# Patient Record
Sex: MALE | Race: WHITE | NOT HISPANIC OR LATINO | Employment: OTHER | URBAN - METROPOLITAN AREA
[De-identification: names, ages, dates, MRNs, and addresses within clinical notes are randomized per-mention and may not be internally consistent; named-entity substitution may affect disease eponyms.]

---

## 2018-10-02 PROBLEM — Z12.11 COLON CANCER SCREENING: Status: ACTIVE | Noted: 2018-10-02

## 2019-04-22 ENCOUNTER — TELEPHONE (OUTPATIENT)
Dept: FAMILY MEDICINE | Facility: CLINIC | Age: 69
End: 2019-04-22

## 2019-04-22 NOTE — TELEPHONE ENCOUNTER
----- Message from Melita Barrera sent at 4/22/2019 11:58 AM CDT -----    Type: Needs Medical Advice    Who Called:  pt  Best Call Back Number:[phone]  Additional Information:  Pt  Is calling  With  Questions  About  A  Surgery  Release   Paperwork   Also  Has  Questions  About  The  Script   For     Steroids

## 2019-04-29 ENCOUNTER — PATIENT OUTREACH (OUTPATIENT)
Dept: ADMINISTRATIVE | Facility: HOSPITAL | Age: 69
End: 2019-04-29

## 2019-04-29 NOTE — PROGRESS NOTES
Health Maintenance Due   Topic Date Due    Hepatitis C Screening  1950    TETANUS VACCINE  09/25/1969    Pneumococcal Vaccine (65+ Low/Medium Risk) (2 of 2 - PCV13) 09/25/2018     Pre-visit Chart review complete/scrubbed for this upcoming office visit

## 2019-05-09 ENCOUNTER — OFFICE VISIT (OUTPATIENT)
Dept: FAMILY MEDICINE | Facility: CLINIC | Age: 69
End: 2019-05-09
Payer: MEDICARE

## 2019-05-09 VITALS
WEIGHT: 315 LBS | TEMPERATURE: 98 F | SYSTOLIC BLOOD PRESSURE: 139 MMHG | DIASTOLIC BLOOD PRESSURE: 86 MMHG | HEIGHT: 71 IN | OXYGEN SATURATION: 97 % | BODY MASS INDEX: 44.1 KG/M2 | HEART RATE: 69 BPM

## 2019-05-09 DIAGNOSIS — G47.33 OBSTRUCTIVE SLEEP APNEA SYNDROME: ICD-10-CM

## 2019-05-09 DIAGNOSIS — R73.03 PRE-DIABETES: ICD-10-CM

## 2019-05-09 DIAGNOSIS — G56.03 CARPAL TUNNEL SYNDROME ON BOTH SIDES: ICD-10-CM

## 2019-05-09 DIAGNOSIS — E66.01 CLASS 3 SEVERE OBESITY DUE TO EXCESS CALORIES WITHOUT SERIOUS COMORBIDITY WITH BODY MASS INDEX (BMI) OF 50.0 TO 59.9 IN ADULT: ICD-10-CM

## 2019-05-09 DIAGNOSIS — N40.1 BPH ASSOCIATED WITH NOCTURIA: ICD-10-CM

## 2019-05-09 DIAGNOSIS — R35.1 BPH ASSOCIATED WITH NOCTURIA: ICD-10-CM

## 2019-05-09 DIAGNOSIS — F32.4 MAJOR DEPRESSIVE DISORDER WITH SINGLE EPISODE, IN PARTIAL REMISSION: ICD-10-CM

## 2019-05-09 DIAGNOSIS — I10 ESSENTIAL HYPERTENSION: ICD-10-CM

## 2019-05-09 PROBLEM — E66.813 CLASS 3 SEVERE OBESITY DUE TO EXCESS CALORIES WITHOUT SERIOUS COMORBIDITY IN ADULT: Status: ACTIVE | Noted: 2019-05-09

## 2019-05-09 PROCEDURE — 99999 PR PBB SHADOW E&M-EST. PATIENT-LVL III: ICD-10-PCS | Mod: PBBFAC,,, | Performed by: INTERNAL MEDICINE

## 2019-05-09 PROCEDURE — 99214 PR OFFICE/OUTPT VISIT, EST, LEVL IV, 30-39 MIN: ICD-10-PCS | Mod: S$PBB,25,, | Performed by: INTERNAL MEDICINE

## 2019-05-09 PROCEDURE — 99213 OFFICE O/P EST LOW 20 MIN: CPT | Mod: PBBFAC,PO | Performed by: INTERNAL MEDICINE

## 2019-05-09 PROCEDURE — 99999 PR PBB SHADOW E&M-EST. PATIENT-LVL III: CPT | Mod: PBBFAC,,, | Performed by: INTERNAL MEDICINE

## 2019-05-09 PROCEDURE — 99214 OFFICE O/P EST MOD 30 MIN: CPT | Mod: S$PBB,25,, | Performed by: INTERNAL MEDICINE

## 2019-05-09 RX ORDER — DICLOFENAC SODIUM 10 MG/G
GEL TOPICAL
Refills: 1 | COMMUNITY
Start: 2019-05-07 | End: 2019-09-20 | Stop reason: CLARIF

## 2019-05-09 NOTE — PROGRESS NOTES
Patient ID: Corey De La O     Chief Complaint:   Chief Complaint   Patient presents with    Surgery Clearance        HPI: New pt to [facility], but known to me. Pre-op clearance for L CTR by Dr. Pack coming up. Doing well. No CP or SOB. May not need CTR on R. 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 Carpal Tunnel was found at a later date.     Review of Systems   Constitutional: Negative.    HENT: Negative.    Eyes: Negative.    Respiratory: Negative.    Cardiovascular: Negative.    Gastrointestinal: Negative.    Endocrine: Negative.    Genitourinary: Negative.    Musculoskeletal: Negative.    Skin: Negative.    Allergic/Immunologic: Negative.    Neurological: Positive for numbness.   Hematological: Negative.    Psychiatric/Behavioral: Negative.           Objective:      Physical Exam   Physical Exam   Constitutional: He is oriented to person, place, and time. He appears well-developed and well-nourished.   HENT:   Head: Normocephalic and atraumatic.   Nose: Nose normal.   Mouth/Throat: Oropharynx is clear and moist.   Eyes: Pupils are equal, round, and reactive to light. Conjunctivae and EOM are normal.   Neck: Normal range of motion. Neck supple.   Cardiovascular: Normal rate, regular rhythm, normal heart sounds and intact distal pulses.   Pulmonary/Chest: Effort normal and breath sounds normal.   Abdominal: Soft. Bowel sounds are normal.   Musculoskeletal: Normal range of motion.   Neurological: He is alert and oriented to person, place, and time.   Skin: Skin is warm and dry. Capillary refill takes less than 2 seconds.   Psychiatric: He has a normal mood and affect. His behavior is normal. Judgment and thought content normal.   Nursing note and vitals reviewed.         Vitals:   Vitals:    05/09/19 1443   BP: 139/86   Pulse: 69   Temp: 97.6 °F (36.4 °C)       Assessment:       Patient Active Problem List    Diagnosis Date Noted    Class 3 severe obesity due  to excess calories without serious comorbidity in adult 05/09/2019    Sleep apnea     Hypertension     Depression     Carpal tunnel syndrome on both sides     BPH associated with nocturia     Colon cancer screening 10/02/2018    Pre-diabetes 01/01/2013          Plan:       Corey was seen today for surgery clearance.    Diagnoses and all orders for this visit:    Obstructive sleep apnea syndrome  Needs another sleep study    Pre-diabetes  -     CBC auto differential; Future  -     Comprehensive metabolic panel; Future  -     Hemoglobin A1c; Future  -     Lipid panel; Future    Essential hypertension  -     Hepatitis C antibody; Future  Controlled    Major depressive disorder with single episode, in partial remission  Controlled     Carpal tunnel syndrome on both sides  Cleared for suregery    BPH associated with nocturia  PSA later     Class 3 severe obesity due to excess calories without serious comorbidity with body mass index (BMI) of 50.0 to 59.9 in adult  -     Lipid panel; Future  Will lose weight through diet and exercise

## 2019-05-09 NOTE — LETTER
May 9, 2019                     [facility]  [address] [facility] [address]  [facility] [address]  Phone: [phone]  Fax: [phone]   Patient: Corey De La O   MR Number: [id number]   YOB: 1950   Date of Visit: 5/9/2019     Dear Dr. Pack,     I had the pleasure of seeing Mr. De La O in clinic today for Pre-Op Clearance for Left Carpal Tunnel release. He is cleared for surgery.     Sincerely,      Carlos Cervnates MD            CC  No Recipients    Enclosure

## 2019-05-20 RX ORDER — DULOXETIN HYDROCHLORIDE 30 MG/1
CAPSULE, DELAYED RELEASE ORAL
Refills: 3 | COMMUNITY
Start: 2019-04-12 | End: 2019-05-20 | Stop reason: SDUPTHER

## 2019-05-20 NOTE — TELEPHONE ENCOUNTER
----- Message from Michael Guzman MA sent at 5/20/2019  4:27 PM CDT -----  Contact: Pt  Pt would like to be called back regarding a med  that was denide for refill .        Pt can be reached at [phone].      Thanks

## 2019-05-21 RX ORDER — DULOXETIN HYDROCHLORIDE 30 MG/1
30 CAPSULE, DELAYED RELEASE ORAL DAILY
Qty: 90 CAPSULE | Refills: 3 | Status: SHIPPED | OUTPATIENT
Start: 2019-05-21 | End: 2019-09-20 | Stop reason: DRUGHIGH

## 2019-06-11 RX ORDER — TAMSULOSIN HYDROCHLORIDE 0.4 MG/1
0.4 CAPSULE ORAL DAILY
Qty: 90 CAPSULE | Refills: 1 | Status: SHIPPED | OUTPATIENT
Start: 2019-06-11 | End: 2019-12-18 | Stop reason: SDUPTHER

## 2019-06-11 NOTE — TELEPHONE ENCOUNTER
----- Message from Huseyin Borges sent at 6/11/2019  9:25 AM CDT -----  Contact: same  Patient called in and stated that Dr. Cervantes has been filling his Rx for tamsulosin (FLOMAX) 0.4 mg Cp24 for years but pharmacy cannot seem to get an answer to get this refilled?      [facility] [employer] [address] [place] [address] & [employer] [address] [place] [address]  Phone: [phone] Fax: [phone]    Patient call back number is [phone]

## 2019-06-13 PROBLEM — G56.02 CARPAL TUNNEL SYNDROME ON LEFT: Status: ACTIVE | Noted: 2019-06-13

## 2019-06-28 RX ORDER — AMLODIPINE BESYLATE 5 MG/1
5 TABLET ORAL DAILY
Qty: 90 TABLET | Refills: 3 | Status: SHIPPED | OUTPATIENT
Start: 2019-06-28 | End: 2020-05-28 | Stop reason: SDUPTHER

## 2019-07-16 RX ORDER — DULOXETIN HYDROCHLORIDE 60 MG/1
60 CAPSULE, DELAYED RELEASE ORAL NIGHTLY
Qty: 90 CAPSULE | Refills: 1 | Status: SHIPPED | OUTPATIENT
Start: 2019-07-16 | End: 2019-09-20 | Stop reason: DRUGHIGH

## 2019-07-26 ENCOUNTER — PATIENT OUTREACH (OUTPATIENT)
Dept: ADMINISTRATIVE | Facility: HOSPITAL | Age: 69
End: 2019-07-26

## 2019-07-26 NOTE — PROGRESS NOTES
Health Maintenance Due   Topic Date Due    Hepatitis C Screening  1950    TETANUS VACCINE  09/25/1969    Pneumococcal Vaccine (65+ Low/Medium Risk) (2 of 2 - PCV13) 09/25/2018     Links down 07/26/2019  Future Appointments   Date Time Provider Department Center   8/5/2019  8:15 AM LAB, [facility]   8/9/2019  1:40 PM Carlos Cervantes MD [facility]

## 2019-08-01 RX ORDER — METOPROLOL TARTRATE 25 MG/1
TABLET, FILM COATED ORAL
Qty: 90 TABLET | Refills: 3 | Status: CANCELLED | OUTPATIENT
Start: 2019-08-01

## 2019-08-01 RX ORDER — METOPROLOL TARTRATE 25 MG/1
TABLET, FILM COATED ORAL
Refills: 0 | COMMUNITY
Start: 2019-05-02 | End: 2019-08-08 | Stop reason: SDUPTHER

## 2019-08-08 DIAGNOSIS — I10 ESSENTIAL HYPERTENSION: Primary | ICD-10-CM

## 2019-08-08 RX ORDER — LOSARTAN POTASSIUM 50 MG/1
50 TABLET ORAL 2 TIMES DAILY
COMMUNITY
End: 2019-08-08 | Stop reason: SDUPTHER

## 2019-08-09 RX ORDER — METOPROLOL TARTRATE 25 MG/1
25 TABLET, FILM COATED ORAL DAILY
Qty: 90 TABLET | Refills: 2 | Status: SHIPPED | OUTPATIENT
Start: 2019-08-09 | End: 2020-06-08 | Stop reason: SDUPTHER

## 2019-08-09 RX ORDER — LOSARTAN POTASSIUM 50 MG/1
50 TABLET ORAL 2 TIMES DAILY
Qty: 180 TABLET | Refills: 2 | Status: SHIPPED | OUTPATIENT
Start: 2019-08-09 | End: 2019-09-20 | Stop reason: DRUGHIGH

## 2019-09-07 ENCOUNTER — PATIENT OUTREACH (OUTPATIENT)
Dept: ADMINISTRATIVE | Facility: HOSPITAL | Age: 69
End: 2019-09-07

## 2019-09-17 ENCOUNTER — LAB VISIT (OUTPATIENT)
Dept: LAB | Facility: HOSPITAL | Age: 69
End: 2019-09-17
Attending: INTERNAL MEDICINE
Payer: MEDICARE

## 2019-09-17 DIAGNOSIS — R73.03 PRE-DIABETES: ICD-10-CM

## 2019-09-17 DIAGNOSIS — E66.01 CLASS 3 SEVERE OBESITY DUE TO EXCESS CALORIES WITHOUT SERIOUS COMORBIDITY WITH BODY MASS INDEX (BMI) OF 50.0 TO 59.9 IN ADULT: ICD-10-CM

## 2019-09-17 DIAGNOSIS — I10 ESSENTIAL HYPERTENSION: ICD-10-CM

## 2019-09-17 DIAGNOSIS — G47.33 OSA (OBSTRUCTIVE SLEEP APNEA): ICD-10-CM

## 2019-09-17 LAB
ALBUMIN SERPL BCP-MCNC: 3.7 G/DL (ref 3.5–5.2)
ALP SERPL-CCNC: 83 U/L (ref 55–135)
ALT SERPL W/O P-5'-P-CCNC: 14 U/L (ref 10–44)
ANION GAP SERPL CALC-SCNC: 11 MMOL/L (ref 8–16)
AST SERPL-CCNC: 14 U/L (ref 10–40)
BASOPHILS # BLD AUTO: 0.1 K/UL (ref 0–0.2)
BASOPHILS NFR BLD: 0.8 % (ref 0–1.9)
BILIRUB SERPL-MCNC: 0.6 MG/DL (ref 0.1–1)
BUN SERPL-MCNC: 22 MG/DL (ref 8–23)
CALCIUM SERPL-MCNC: 9.6 MG/DL (ref 8.7–10.5)
CHLORIDE SERPL-SCNC: 109 MMOL/L (ref 95–110)
CHOLEST SERPL-MCNC: 194 MG/DL (ref 120–199)
CHOLEST/HDLC SERPL: 4 {RATIO} (ref 2–5)
CO2 SERPL-SCNC: 23 MMOL/L (ref 23–29)
CREAT SERPL-MCNC: 1 MG/DL (ref 0.5–1.4)
DIFFERENTIAL METHOD: ABNORMAL
EOSINOPHIL # BLD AUTO: 0.3 K/UL (ref 0–0.5)
EOSINOPHIL NFR BLD: 2.9 % (ref 0–8)
ERYTHROCYTE [DISTWIDTH] IN BLOOD BY AUTOMATED COUNT: 15.5 % (ref 11.5–14.5)
EST. GFR  (AFRICAN AMERICAN): >60 ML/MIN/1.73 M^2
EST. GFR  (NON AFRICAN AMERICAN): >60 ML/MIN/1.73 M^2
ESTIMATED AVG GLUCOSE: 120 MG/DL (ref 68–131)
GLUCOSE SERPL-MCNC: 131 MG/DL (ref 70–110)
HBA1C MFR BLD HPLC: 5.8 % (ref 4–5.6)
HCT VFR BLD AUTO: 46.9 % (ref 40–54)
HDLC SERPL-MCNC: 48 MG/DL (ref 40–75)
HDLC SERPL: 24.7 % (ref 20–50)
HGB BLD-MCNC: 14.1 G/DL (ref 14–18)
IMM GRANULOCYTES # BLD AUTO: 0.04 K/UL (ref 0–0.04)
IMM GRANULOCYTES NFR BLD AUTO: 0.3 % (ref 0–0.5)
LDLC SERPL CALC-MCNC: 123.8 MG/DL (ref 63–159)
LYMPHOCYTES # BLD AUTO: 2.1 K/UL (ref 1–4.8)
LYMPHOCYTES NFR BLD: 17.5 % (ref 18–48)
MCH RBC QN AUTO: 27.6 PG (ref 27–31)
MCHC RBC AUTO-ENTMCNC: 30.1 G/DL (ref 32–36)
MCV RBC AUTO: 92 FL (ref 82–98)
MONOCYTES # BLD AUTO: 1 K/UL (ref 0.3–1)
MONOCYTES NFR BLD: 8.8 % (ref 4–15)
NEUTROPHILS # BLD AUTO: 8.2 K/UL (ref 1.8–7.7)
NEUTROPHILS NFR BLD: 69.7 % (ref 38–73)
NONHDLC SERPL-MCNC: 146 MG/DL
NRBC BLD-RTO: 0 /100 WBC
PLATELET # BLD AUTO: 443 K/UL (ref 150–350)
PMV BLD AUTO: 8.8 FL (ref 9.2–12.9)
POTASSIUM SERPL-SCNC: 4.7 MMOL/L (ref 3.5–5.1)
PROT SERPL-MCNC: 8 G/DL (ref 6–8.4)
RBC # BLD AUTO: 5.1 M/UL (ref 4.6–6.2)
SODIUM SERPL-SCNC: 143 MMOL/L (ref 136–145)
TRIGL SERPL-MCNC: 111 MG/DL (ref 30–150)
TSH SERPL DL<=0.005 MIU/L-ACNC: 1.66 UIU/ML (ref 0.4–4)
WBC # BLD AUTO: 11.83 K/UL (ref 3.9–12.7)

## 2019-09-17 PROCEDURE — 80053 COMPREHEN METABOLIC PANEL: CPT

## 2019-09-17 PROCEDURE — 36415 COLL VENOUS BLD VENIPUNCTURE: CPT | Mod: PO

## 2019-09-17 PROCEDURE — 83036 HEMOGLOBIN GLYCOSYLATED A1C: CPT

## 2019-09-17 PROCEDURE — 84443 ASSAY THYROID STIM HORMONE: CPT

## 2019-09-17 PROCEDURE — 80061 LIPID PANEL: CPT

## 2019-09-17 PROCEDURE — 85025 COMPLETE CBC W/AUTO DIFF WBC: CPT

## 2019-09-17 PROCEDURE — 86803 HEPATITIS C AB TEST: CPT

## 2019-09-18 LAB — HCV AB SERPL QL IA: NEGATIVE

## 2019-09-20 ENCOUNTER — OFFICE VISIT (OUTPATIENT)
Dept: FAMILY MEDICINE | Facility: CLINIC | Age: 69
End: 2019-09-20
Payer: MEDICARE

## 2019-09-20 VITALS
HEART RATE: 77 BPM | DIASTOLIC BLOOD PRESSURE: 78 MMHG | SYSTOLIC BLOOD PRESSURE: 138 MMHG | WEIGHT: 315 LBS | OXYGEN SATURATION: 97 % | BODY MASS INDEX: 44.1 KG/M2 | RESPIRATION RATE: 16 BRPM | HEIGHT: 71 IN | TEMPERATURE: 98 F

## 2019-09-20 DIAGNOSIS — R73.03 PRE-DIABETES: ICD-10-CM

## 2019-09-20 DIAGNOSIS — E66.01 CLASS 3 SEVERE OBESITY DUE TO EXCESS CALORIES WITHOUT SERIOUS COMORBIDITY WITH BODY MASS INDEX (BMI) OF 50.0 TO 59.9 IN ADULT: ICD-10-CM

## 2019-09-20 DIAGNOSIS — M15.0 PRIMARY GENERALIZED (OSTEO)ARTHRITIS: ICD-10-CM

## 2019-09-20 DIAGNOSIS — G47.33 OBSTRUCTIVE SLEEP APNEA SYNDROME: ICD-10-CM

## 2019-09-20 DIAGNOSIS — I10 ESSENTIAL HYPERTENSION: Primary | ICD-10-CM

## 2019-09-20 DIAGNOSIS — R35.1 BPH ASSOCIATED WITH NOCTURIA: ICD-10-CM

## 2019-09-20 DIAGNOSIS — N40.1 BPH ASSOCIATED WITH NOCTURIA: ICD-10-CM

## 2019-09-20 DIAGNOSIS — Z12.5 PROSTATE CANCER SCREENING: ICD-10-CM

## 2019-09-20 DIAGNOSIS — E78.49 OTHER HYPERLIPIDEMIA: ICD-10-CM

## 2019-09-20 PROCEDURE — 99213 OFFICE O/P EST LOW 20 MIN: CPT | Mod: PBBFAC,PO | Performed by: INTERNAL MEDICINE

## 2019-09-20 PROCEDURE — 99214 OFFICE O/P EST MOD 30 MIN: CPT | Mod: S$PBB,,, | Performed by: INTERNAL MEDICINE

## 2019-09-20 PROCEDURE — 99999 PR PBB SHADOW E&M-EST. PATIENT-LVL III: ICD-10-PCS | Mod: PBBFAC,,, | Performed by: INTERNAL MEDICINE

## 2019-09-20 PROCEDURE — 99999 PR PBB SHADOW E&M-EST. PATIENT-LVL III: CPT | Mod: PBBFAC,,, | Performed by: INTERNAL MEDICINE

## 2019-09-20 PROCEDURE — 99214 PR OFFICE/OUTPT VISIT, EST, LEVL IV, 30-39 MIN: ICD-10-PCS | Mod: S$PBB,,, | Performed by: INTERNAL MEDICINE

## 2019-09-20 RX ORDER — PREDNISOLONE ACETATE 10 MG/ML
1 SUSPENSION/ DROPS OPHTHALMIC EVERY 4 HOURS
Status: ON HOLD | COMMUNITY
Start: 2019-09-19 | End: 2023-04-28

## 2019-09-20 RX ORDER — DICLOFENAC SODIUM 50 MG/1
100 TABLET, DELAYED RELEASE ORAL DAILY PRN
Qty: 30 TABLET | Refills: 5 | Status: SHIPPED | OUTPATIENT
Start: 2019-09-20 | End: 2021-01-14

## 2019-09-20 RX ORDER — LOSARTAN POTASSIUM 50 MG/1
50 TABLET ORAL DAILY
COMMUNITY
End: 2021-01-06

## 2019-09-20 RX ORDER — DULOXETIN HYDROCHLORIDE 30 MG/1
30 CAPSULE, DELAYED RELEASE ORAL
COMMUNITY
End: 2020-06-14

## 2019-09-20 NOTE — PROGRESS NOTES
Patient ID: Corey De La O     Chief Complaint:   Chief Complaint   Patient presents with    Hypertension     follow up        HPI: Follow up for hypertension and osteoarthritis. Requests Diclofenac 100 mg / day as needed. hypertension  Controlled. Labs reviewed: everything good, still no diabetes mellitus and LDL slightly high,but we discussed lowering saturated fat in diet and exercising to both lose weight, decreased LDL and increase HDL.     Review of Systems   Constitutional: Negative.    HENT: Negative.    Eyes: Negative.    Respiratory: Negative.    Cardiovascular: Negative.    Gastrointestinal: Negative.    Endocrine: Negative.    Genitourinary: Negative.    Musculoskeletal: Positive for arthralgias.   Skin: Negative.    Allergic/Immunologic: Negative.    Neurological: Negative.    Hematological: Negative.    Psychiatric/Behavioral: Negative.           Objective:      Physical Exam   Physical Exam   Constitutional: He is oriented to person, place, and time. He appears well-developed and well-nourished.   HENT:   Head: Normocephalic and atraumatic.   Nose: Nose normal.   Mouth/Throat: Oropharynx is clear and moist.   Eyes: Pupils are equal, round, and reactive to light. Conjunctivae and EOM are normal.   Neck: Normal range of motion. Neck supple.   Cardiovascular: Normal rate, regular rhythm, normal heart sounds and intact distal pulses.   Pulmonary/Chest: Effort normal and breath sounds normal.   Abdominal: Soft. Bowel sounds are normal.   Large pannus in anterior abdomen    Musculoskeletal: Normal range of motion.   Neurological: He is alert and oriented to person, place, and time.   Skin: Skin is warm and dry. Capillary refill takes less than 2 seconds.   Psychiatric: He has a normal mood and affect. His behavior is normal. Judgment and thought content normal.   Nursing note and vitals reviewed.       Current Outpatient Medications:     amLODIPine (NORVASC) 5 MG tablet, Take 1 tablet (5 mg total) by  "mouth once daily., Disp: 90 tablet, Rfl: 3    aspirin (ECOTRIN) 81 MG EC tablet, Take 81 mg by mouth nightly. , Disp: , Rfl:     atenolol (TENORMIN) 50 MG tablet, Take 50 mg by mouth once daily., Disp: , Rfl:     b complex vitamins tablet, Take 1 tablet by mouth 3 (three) times a week., Disp: , Rfl:     DULoxetine (CYMBALTA) 30 MG capsule, Take 30 mg by mouth. Take 2 tablets in the am and 1 tablet in the evening., Disp: , Rfl:     losartan (COZAAR) 50 MG tablet, Take 50 mg by mouth once daily., Disp: , Rfl:     metoprolol tartrate (LOPRESSOR) 25 MG tablet, Take 1 tablet (25 mg total) by mouth once daily., Disp: 90 tablet, Rfl: 2    multivitamin (ONE DAILY MULTIVITAMIN) per tablet, Take 1 tablet by mouth once daily., Disp: , Rfl:     prednisoLONE acetate (PRED FORTE) 1 % DrpS, 1 drop every 4 (four) hours. , Disp: , Rfl:     tamsulosin (FLOMAX) 0.4 mg Cap, Take 1 capsule (0.4 mg total) by mouth once daily., Disp: 90 capsule, Rfl: 1    diclofenac (VOLTAREN) 50 MG EC tablet, Take 2 tablets (100 mg total) by mouth daily as needed (arthritis pain)., Disp: 30 tablet, Rfl: 5        Vitals:   Vitals:    09/20/19 1118   BP: 138/78   Pulse: 77   Resp: 16   Temp: 98.1 °F (36.7 °C)   TempSrc: Oral   SpO2: 97%   Weight: (!) 172.4 kg (380 lb 1.2 oz)   Height: 5' 11" (1.803 m)      Assessment:       Patient Active Problem List    Diagnosis Date Noted    Other hyperlipidemia 09/20/2019    Primary generalized (osteo)arthritis 09/20/2019    Carpal tunnel syndrome on left 06/13/2019    Class 3 severe obesity due to excess calories without serious comorbidity with body mass index (BMI) of 50.0 to 59.9 in adult 05/09/2019    Sleep apnea     Hypertension     Depression     Carpal tunnel syndrome on both sides     BPH associated with nocturia     Colon cancer screening 10/02/2018    Pre-diabetes 01/01/2013          Plan:      Corey was seen today for hypertension.    Diagnoses and all orders for this " visit:    Essential hypertension  Controlled     Primary generalized (osteo)arthritis  -     diclofenac (VOLTAREN) 50 MG EC tablet; Take 2 tablets (100 mg total) by mouth daily as needed (arthritis pain).    Pre-diabetes  -     Hemoglobin A1c; Future  Controlled     BPH associated with nocturia  Monitor     Class 3 severe obesity due to excess calories without serious comorbidity with body mass index (BMI) of 50.0 to 59.9 in adult  We discussed diet & exercise to both lose weight and improve lipids    Obstructive sleep apnea syndrome  Controlled     Other hyperlipidemia  -     Lipid panel; Future  Monitor off meds     Prostate cancer screening  -     PSA, Screening; Future

## 2019-12-18 RX ORDER — TAMSULOSIN HYDROCHLORIDE 0.4 MG/1
0.4 CAPSULE ORAL DAILY
Qty: 90 CAPSULE | Refills: 3 | Status: SHIPPED | OUTPATIENT
Start: 2019-12-18 | End: 2020-12-17 | Stop reason: SDUPTHER

## 2020-01-18 RX ORDER — DULOXETIN HYDROCHLORIDE 60 MG/1
CAPSULE, DELAYED RELEASE ORAL
Qty: 90 CAPSULE | Refills: 3 | Status: SHIPPED | OUTPATIENT
Start: 2020-01-18 | End: 2021-01-06

## 2020-04-26 DIAGNOSIS — I10 ESSENTIAL HYPERTENSION: ICD-10-CM

## 2020-04-27 RX ORDER — METOPROLOL TARTRATE 25 MG/1
TABLET, FILM COATED ORAL
Qty: 90 TABLET | Refills: 3 | OUTPATIENT
Start: 2020-04-27

## 2020-04-27 NOTE — TELEPHONE ENCOUNTER
Atenolol is on his active Med list. Please confirm if he's taking both Metoprolol and Atenolol or just the one.

## 2020-05-26 ENCOUNTER — LAB VISIT (OUTPATIENT)
Dept: LAB | Facility: HOSPITAL | Age: 70
End: 2020-05-26
Attending: INTERNAL MEDICINE
Payer: MEDICARE

## 2020-05-26 DIAGNOSIS — E78.49 OTHER HYPERLIPIDEMIA: ICD-10-CM

## 2020-05-26 DIAGNOSIS — R73.03 PRE-DIABETES: ICD-10-CM

## 2020-05-26 DIAGNOSIS — Z12.5 PROSTATE CANCER SCREENING: ICD-10-CM

## 2020-05-26 LAB
CHOLEST SERPL-MCNC: 176 MG/DL (ref 120–199)
CHOLEST/HDLC SERPL: 3.7 {RATIO} (ref 2–5)
COMPLEXED PSA SERPL-MCNC: 2.8 NG/ML (ref 0–4)
ESTIMATED AVG GLUCOSE: 120 MG/DL (ref 68–131)
HBA1C MFR BLD HPLC: 5.8 % (ref 4–5.6)
HDLC SERPL-MCNC: 48 MG/DL (ref 40–75)
HDLC SERPL: 27.3 % (ref 20–50)
LDLC SERPL CALC-MCNC: 105 MG/DL (ref 63–159)
NONHDLC SERPL-MCNC: 128 MG/DL
TRIGL SERPL-MCNC: 115 MG/DL (ref 30–150)

## 2020-05-26 PROCEDURE — 84153 ASSAY OF PSA TOTAL: CPT

## 2020-05-26 PROCEDURE — 36415 COLL VENOUS BLD VENIPUNCTURE: CPT | Mod: PO

## 2020-05-26 PROCEDURE — 80061 LIPID PANEL: CPT

## 2020-05-26 PROCEDURE — 83036 HEMOGLOBIN GLYCOSYLATED A1C: CPT

## 2020-05-28 ENCOUNTER — OFFICE VISIT (OUTPATIENT)
Dept: FAMILY MEDICINE | Facility: CLINIC | Age: 70
End: 2020-05-28
Payer: MEDICARE

## 2020-05-28 ENCOUNTER — TELEPHONE (OUTPATIENT)
Dept: FAMILY MEDICINE | Facility: CLINIC | Age: 70
End: 2020-05-28

## 2020-05-28 DIAGNOSIS — R73.01 IMPAIRED FASTING GLUCOSE: ICD-10-CM

## 2020-05-28 DIAGNOSIS — E78.49 OTHER HYPERLIPIDEMIA: ICD-10-CM

## 2020-05-28 DIAGNOSIS — I10 ESSENTIAL HYPERTENSION: Primary | ICD-10-CM

## 2020-05-28 PROCEDURE — 99213 OFFICE O/P EST LOW 20 MIN: CPT | Mod: 95,,, | Performed by: INTERNAL MEDICINE

## 2020-05-28 PROCEDURE — 99213 PR OFFICE/OUTPT VISIT, EST, LEVL III, 20-29 MIN: ICD-10-PCS | Mod: 95,,, | Performed by: INTERNAL MEDICINE

## 2020-05-28 RX ORDER — AMLODIPINE BESYLATE 5 MG/1
5 TABLET ORAL DAILY
Qty: 90 TABLET | Refills: 3 | Status: SHIPPED | OUTPATIENT
Start: 2020-05-28 | End: 2021-06-06

## 2020-05-28 NOTE — PROGRESS NOTES
Patient ID: Corey De La O     Chief Complaint: Follow up for hypertension     The patient location is: [place]   The chief complaint leading to consultation is: Follow up hypertension     Visit type: audiovisual    Face to Face time with patient: 10 minutes   15 minutes of total time spent on the encounter, which includes face to face time and non-face to face time preparing to see the patient (eg, review of tests), Obtaining and/or reviewing separately obtained history, Documenting clinical information in the electronic or other health record, Independently interpreting results (not separately reported) and communicating results to the patient/family/caregiver, or Care coordination (not separately reported).         Each patient to whom he or she provides medical services by telemedicine is:  (1) informed of the relationship between the physician and patient and the respective role of any other health care provider with respect to management of the patient; and (2) notified that he or she may decline to receive medical services by telemedicine and may withdraw from such care at any time.    HPI:  Follow-up for hypertension that has been well controlled.  He needs a refill on his amlodipine which I have provided.  Labs were reviewed.  His PSA is low, he is impaired fasting glucose is the same with an A1c of 5.8, his lipids have actually improved over the past few months and her only slightly high.  He admits to gaining a few lb since on the quarantine so we discussed diet and exercise.    Review of Systems   Constitutional: Negative.    HENT: Negative.    Eyes: Negative.    Respiratory: Negative.    Cardiovascular: Negative.    Gastrointestinal: Negative.    Endocrine: Negative.    Genitourinary: Negative.    Musculoskeletal: Negative.    Skin: Negative.    Allergic/Immunologic: Negative.    Neurological: Negative.    Hematological: Negative.    Psychiatric/Behavioral: Negative.           Objective:      Physical  Exam   Physical Exam   Constitutional: He is oriented to person, place, and time. He appears well-developed and well-nourished.   HENT:   Head: Normocephalic and atraumatic.   Eyes: EOM are normal.   Neck: Normal range of motion.   Pulmonary/Chest: Effort normal.   Neurological: He is alert and oriented to person, place, and time.   Skin: Skin is dry.   Psychiatric: He has a normal mood and affect. His behavior is normal. Judgment and thought content normal.       Current Outpatient Medications:     amLODIPine (NORVASC) 5 MG tablet, Take 1 tablet (5 mg total) by mouth once daily., Disp: 90 tablet, Rfl: 3    aspirin (ECOTRIN) 81 MG EC tablet, Take 81 mg by mouth nightly. , Disp: , Rfl:     atenolol (TENORMIN) 50 MG tablet, Take 50 mg by mouth once daily., Disp: , Rfl:     b complex vitamins tablet, Take 1 tablet by mouth 3 (three) times a week., Disp: , Rfl:     diclofenac (VOLTAREN) 50 MG EC tablet, Take 2 tablets (100 mg total) by mouth daily as needed (arthritis pain)., Disp: 30 tablet, Rfl: 5    DULoxetine (CYMBALTA) 30 MG capsule, Take 30 mg by mouth. Take 2 tablets in the am and 1 tablet in the evening., Disp: , Rfl:     DULoxetine (CYMBALTA) 60 MG capsule, TAKE 1 CAPSULE(60 MG) BY MOUTH EVERY EVENING, Disp: 90 capsule, Rfl: 3    losartan (COZAAR) 50 MG tablet, Take 50 mg by mouth once daily., Disp: , Rfl:     metoprolol tartrate (LOPRESSOR) 25 MG tablet, Take 1 tablet (25 mg total) by mouth once daily., Disp: 90 tablet, Rfl: 2    multivitamin (ONE DAILY MULTIVITAMIN) per tablet, Take 1 tablet by mouth once daily., Disp: , Rfl:     prednisoLONE acetate (PRED FORTE) 1 % DrpS, 1 drop every 4 (four) hours. , Disp: , Rfl:     tamsulosin (FLOMAX) 0.4 mg Cap, Take 1 capsule (0.4 mg total) by mouth once daily., Disp: 90 capsule, Rfl: 3         Vitals: There were no vitals filed for this visit.   Assessment:       Patient Active Problem List    Diagnosis Date Noted    Other hyperlipidemia 09/20/2019     Primary generalized (osteo)arthritis 09/20/2019    Carpal tunnel syndrome on left 06/13/2019    Class 3 severe obesity due to excess calories without serious comorbidity with body mass index (BMI) of 50.0 to 59.9 in adult 05/09/2019    Sleep apnea     Hypertension     Depression     Carpal tunnel syndrome on both sides     BPH associated with nocturia     Colon cancer screening 10/02/2018    Impaired fasting glucose 01/01/2013          Plan:       Corey HERNANDEZ De La O  was seen today for follow-up and may need lab work.    Diagnoses and all orders for this visit:    Diagnoses and all orders for this visit:    Essential hypertension  -     amLODIPine (NORVASC) 5 MG tablet; Take 1 tablet (5 mg total) by mouth once daily.  -     CBC auto differential; Future  -     Comprehensive metabolic panel; Future  -     Lipid Panel; Future    Other hyperlipidemia  -     Lipid Panel; Future    Impaired fasting glucose  -     Hemoglobin A1C; Future

## 2020-05-28 NOTE — TELEPHONE ENCOUNTER
Spoke with pt, he slipped and fell at home before appt wanted VV. Changed to VV.      ----- Message from Zahira Albright MA sent at 5/28/2020 10:30 AM CDT -----  Contact: patient  Type: Needs Medical Advice  Who Called:  Corey  Abdias Call Back Number:   Additional Information: patient is unable to attend his appointment today.  He fell and is unable to move around very well.  He is willing to do a virtual visit.  Please call to discuss.

## 2020-06-08 DIAGNOSIS — I10 ESSENTIAL HYPERTENSION: ICD-10-CM

## 2020-06-08 RX ORDER — METOPROLOL TARTRATE 25 MG/1
25 TABLET, FILM COATED ORAL DAILY
Qty: 90 TABLET | Refills: 3 | Status: SHIPPED | OUTPATIENT
Start: 2020-06-08 | End: 2021-07-24

## 2020-06-08 NOTE — TELEPHONE ENCOUNTER
----- Message from Ami Vargas sent at 6/8/2020  9:21 AM CDT -----  Contact: self  Type:  RX Refill Request    Who Called:  self  Refill or New Rx:  refill  RX Name and Strength:  metoprolol tartrate (LOPRESSOR) 25 MG tablet  How is the patient currently taking it? (ex. 1XDay):  1Xday  Is this a 30 day or 90 day RX:  90  Preferred Pharmacy with phone number:    [id number] - [place] [address] [employer] [address] [place] [address] & [employer] [address] [place] [address]  Phone: [phone] Fax: [phone]  Local or Mail Order:  local  Ordering Provider:  Dr Helen Calero Call Back Number:  [phone] (home)   Additional Information:  Patient states pharmacy tried to fax and told him the office was not accepting faxes. Please call patient when sent. Thanks!

## 2020-06-20 ENCOUNTER — TELEPHONE (OUTPATIENT)
Dept: FAMILY MEDICINE | Facility: CLINIC | Age: 70
End: 2020-06-20

## 2020-06-20 NOTE — TELEPHONE ENCOUNTER
----- Message from Sheldon Jasso sent at 6/20/2020  9:08 AM CDT -----  Regarding: pt  Type: Needs Medical Advice    Who Called:  pt    Best Call Back Number: [phone]   Additional Information: pt spiked fever on 6/17/2020 and wanted to let office know b/c has not other symptoms but fever. Fever only lasted couple hours on that day. Please call to discuss b/c does not have cellulitis like in past.

## 2020-06-20 NOTE — TELEPHONE ENCOUNTER
Called pt, he stated Wednesday 6/17/20 he spiked a fever for a few hours then it went away. He stated prior to him getting cellulitis he gets a fever in the same way. But nothing has appeared on his left leg (the spot he has recurrent cellulitis). No body aches, no extended fever, no cough and hasn't been around anyone that he knows of that has been positive for covid19. He wanted to place a call in to pcp to advise and see what he thinks he should do since he does have a lot of risk factors. Please advise pcp is out for 2 weeks.

## 2020-06-22 NOTE — TELEPHONE ENCOUNTER
I would recommend the patient isolate for about 7 days from the day of the fever  Take Tylenol as needed keep fever down  Report to emergency department if he develops shortness of breath or high fever that will go down

## 2020-10-01 ENCOUNTER — PATIENT MESSAGE (OUTPATIENT)
Dept: OTHER | Facility: OTHER | Age: 70
End: 2020-10-01

## 2020-12-11 ENCOUNTER — PATIENT MESSAGE (OUTPATIENT)
Dept: OTHER | Facility: OTHER | Age: 70
End: 2020-12-11

## 2020-12-19 RX ORDER — TAMSULOSIN HYDROCHLORIDE 0.4 MG/1
0.4 CAPSULE ORAL DAILY
Qty: 90 CAPSULE | Refills: 3 | Status: SHIPPED | OUTPATIENT
Start: 2020-12-19 | End: 2022-02-09

## 2021-01-14 DIAGNOSIS — M15.0 PRIMARY GENERALIZED (OSTEO)ARTHRITIS: ICD-10-CM

## 2021-01-14 RX ORDER — DICLOFENAC SODIUM 50 MG/1
TABLET, DELAYED RELEASE ORAL
Qty: 30 TABLET | Refills: 5 | Status: SHIPPED | OUTPATIENT
Start: 2021-01-14 | End: 2022-04-13 | Stop reason: SDUPTHER

## 2021-01-22 ENCOUNTER — PATIENT MESSAGE (OUTPATIENT)
Dept: ADMINISTRATIVE | Facility: OTHER | Age: 71
End: 2021-01-22

## 2022-02-09 RX ORDER — TAMSULOSIN HYDROCHLORIDE 0.4 MG/1
CAPSULE ORAL
Qty: 90 CAPSULE | Refills: 0 | Status: SHIPPED | OUTPATIENT
Start: 2022-02-09 | End: 2022-04-13 | Stop reason: SDUPTHER

## 2022-02-09 NOTE — TELEPHONE ENCOUNTER
Care Due:                  Date            Visit Type   Department     Provider  --------------------------------------------------------------------------------                                ESTABLISHED                              PATIENT -    [facility] FAM[facility]  Last Visit: 05-      [facility]       Carlos Cervantes  Next Visit: None Scheduled  None         None Found                                                            Last  Test          Frequency    Reason                     Performed    Due Date  --------------------------------------------------------------------------------    Office Visit  12 months..  losartan, tamsulosin.....  05- 05-    [facility].........  12 months..  losartan.................  Not Found    Overdue    Powered by [employer]. Reference number: [id number].   2/09/2022 8:12:50 AM CST

## 2022-04-08 NOTE — TELEPHONE ENCOUNTER
No new care gaps identified.  Powered by [employer]. Reference number: [id number].   4/08/2022 12:29:13 PM CDT

## 2022-04-10 NOTE — TELEPHONE ENCOUNTER
Refill Routing Note   Medication(s) are not appropriate for processing by [facility] for the following reason(s):      - Patient has not been seen in over 15 months by PCP    [facility] action(s):  Defer          Medication reconciliation completed: No     Appointments  past 12m or future 3m with PCP    Date Provider   Last Visit   5/28/2020 Carlos Cervantes MD   Next Visit   Visit date not found Carlos Cervantes MD   ED visits in past 90 days: 0        Note composed:11:17 AM 04/10/2022

## 2022-04-11 ENCOUNTER — TELEPHONE (OUTPATIENT)
Dept: FAMILY MEDICINE | Facility: CLINIC | Age: 72
End: 2022-04-11
Payer: MEDICARE

## 2022-04-11 RX ORDER — DULOXETIN HYDROCHLORIDE 60 MG/1
CAPSULE, DELAYED RELEASE ORAL
Qty: 90 CAPSULE | Refills: 3 | OUTPATIENT
Start: 2022-04-11

## 2022-04-11 RX ORDER — DULOXETIN HYDROCHLORIDE 60 MG/1
CAPSULE, DELAYED RELEASE ORAL
Qty: 90 CAPSULE | Refills: 0 | Status: SHIPPED | OUTPATIENT
Start: 2022-04-11 | End: 2022-04-13 | Stop reason: SDUPTHER

## 2022-04-13 ENCOUNTER — OFFICE VISIT (OUTPATIENT)
Dept: FAMILY MEDICINE | Facility: CLINIC | Age: 72
End: 2022-04-13
Payer: MEDICARE

## 2022-04-13 DIAGNOSIS — R35.1 BPH ASSOCIATED WITH NOCTURIA: ICD-10-CM

## 2022-04-13 DIAGNOSIS — F32.4 MAJOR DEPRESSIVE DISORDER WITH SINGLE EPISODE, IN PARTIAL REMISSION: ICD-10-CM

## 2022-04-13 DIAGNOSIS — E78.49 OTHER HYPERLIPIDEMIA: ICD-10-CM

## 2022-04-13 DIAGNOSIS — I10 ESSENTIAL HYPERTENSION: ICD-10-CM

## 2022-04-13 DIAGNOSIS — N40.1 BPH ASSOCIATED WITH NOCTURIA: ICD-10-CM

## 2022-04-13 DIAGNOSIS — I10 PRIMARY HYPERTENSION: Primary | ICD-10-CM

## 2022-04-13 DIAGNOSIS — R73.01 IMPAIRED FASTING GLUCOSE: ICD-10-CM

## 2022-04-13 DIAGNOSIS — Z12.5 PROSTATE CANCER SCREENING: ICD-10-CM

## 2022-04-13 PROCEDURE — 99214 OFFICE O/P EST MOD 30 MIN: CPT | Mod: 95,,, | Performed by: INTERNAL MEDICINE

## 2022-04-13 PROCEDURE — 99214 PR OFFICE/OUTPT VISIT, EST, LEVL IV, 30-39 MIN: ICD-10-PCS | Mod: 95,,, | Performed by: INTERNAL MEDICINE

## 2022-04-13 RX ORDER — METOPROLOL TARTRATE 25 MG/1
25 TABLET, FILM COATED ORAL DAILY
Qty: 90 TABLET | Refills: 3 | Status: ON HOLD | OUTPATIENT
Start: 2022-04-13 | End: 2023-05-03 | Stop reason: HOSPADM

## 2022-04-13 RX ORDER — TAMSULOSIN HYDROCHLORIDE 0.4 MG/1
0.4 CAPSULE ORAL DAILY
Qty: 90 CAPSULE | Refills: 3 | Status: ON HOLD | OUTPATIENT
Start: 2022-04-13 | End: 2023-07-27 | Stop reason: SDUPTHER

## 2022-04-13 RX ORDER — LOSARTAN POTASSIUM 50 MG/1
50 TABLET ORAL 2 TIMES DAILY
Qty: 180 TABLET | Refills: 3 | Status: SHIPPED | OUTPATIENT
Start: 2022-04-13 | End: 2023-05-11

## 2022-04-13 RX ORDER — AMLODIPINE BESYLATE 5 MG/1
5 TABLET ORAL DAILY
Qty: 90 TABLET | Refills: 3 | Status: SHIPPED | OUTPATIENT
Start: 2022-04-13 | End: 2023-04-20

## 2022-04-13 RX ORDER — DULOXETIN HYDROCHLORIDE 60 MG/1
CAPSULE, DELAYED RELEASE ORAL
Qty: 90 CAPSULE | Refills: 3 | Status: SHIPPED | OUTPATIENT
Start: 2022-04-13 | End: 2023-05-22

## 2022-04-13 NOTE — PROGRESS NOTES
Patient ID: Corey De La O     Chief Complaint: Med refills and labs     The patient location is: [place]   The chief complaint leading to consultation is: Med refills and labs    Visit type: audiovisual    Face to Face time with patient: 10 mins   15 minutes of total time spent on the encounter, which includes face to face time and non-face to face time preparing to see the patient (eg, review of tests), Obtaining and/or reviewing separately obtained history, Documenting clinical information in the electronic or other health record, Independently interpreting results (not separately reported) and communicating results to the patient/family/caregiver, or Care coordination (not separately reported).         Each patient to whom he or she provides medical services by telemedicine is:  (1) informed of the relationship between the physician and patient and the respective role of any other health care provider with respect to management of the patient; and (2) notified that he or she may decline to receive medical services by telemedicine and may withdraw from such care at any time.    Notes:       HPI:  Patient has been lost to follow-up during the pandemic and does require medication refills and labs.  He has been concerned about going out of his apartment so he has mainly stayed inside and thankfully has not gotten COVID.  He has received 3 doses of the COVID vaccine and I thank him for that.  He does need refills on his blood pressure medications which I provided and he does need labs checked various things which I have ordered and we will try to get this Saturday.  Other than that, he is doing well but he does notice that his physical activity has decreased over the previous 2 years so I do recommend that he utilized his apartments indoor pool to swim on a regular basis.     Review of Systems   Constitutional: Positive for activity change and unexpected weight change.   HENT: Negative.  Negative for hearing loss,  rhinorrhea and trouble swallowing.    Eyes: Negative.  Negative for discharge and visual disturbance.   Respiratory: Positive for wheezing. Negative for chest tightness.    Cardiovascular: Positive for palpitations. Negative for chest pain.   Gastrointestinal: Negative.  Negative for blood in stool, constipation, diarrhea and vomiting.   Endocrine: Negative.  Negative for polydipsia and polyuria.   Genitourinary: Negative.  Negative for difficulty urinating, hematuria and urgency.   Musculoskeletal: Negative.  Negative for arthralgias, joint swelling and neck pain.   Skin: Negative.    Allergic/Immunologic: Negative.    Neurological: Negative.  Negative for weakness and headaches.   Hematological: Negative.    Psychiatric/Behavioral: Negative.  Negative for confusion and dysphoric mood.          Objective:      Physical Exam   Physical Exam  Constitutional:       Appearance: Normal appearance. He is obese.   HENT:      Head: Normocephalic and atraumatic.   Pulmonary:      Effort: Pulmonary effort is normal.   Neurological:      General: No focal deficit present.      Mental Status: He is alert and oriented to person, place, and time.   Psychiatric:         Mood and Affect: Mood normal.         Thought Content: Thought content normal.            Vitals: There were no vitals filed for this visit.       Current Outpatient Medications:     amLODIPine (NORVASC) 5 MG tablet, Take 1 tablet (5 mg total) by mouth once daily., Disp: 90 tablet, Rfl: 3    aspirin (ECOTRIN) 81 MG EC tablet, Take 81 mg by mouth nightly. , Disp: , Rfl:     b complex vitamins tablet, Take 1 tablet by mouth 3 (three) times a week., Disp: , Rfl:     diclofenac (VOLTAREN) 50 MG EC tablet, TAKE 2 TABLETS(100 MG) BY MOUTH DAILY AS NEEDED FOR ARTHRITIS OR PAIN, Disp: 30 tablet, Rfl: 5    DULoxetine (CYMBALTA) 60 MG capsule, TAKE 1 CAPSULE(60 MG) BY MOUTH EVERY EVENING, Disp: 90 capsule, Rfl: 3    losartan (COZAAR) 50 MG tablet, Take 1 tablet (50 mg  total) by mouth 2 (two) times a day., Disp: 180 tablet, Rfl: 3    metoprolol tartrate (LOPRESSOR) 25 MG tablet, Take 1 tablet (25 mg total) by mouth once daily., Disp: 90 tablet, Rfl: 3    multivitamin (ONE DAILY MULTIVITAMIN) per tablet, Take 1 tablet by mouth once daily., Disp: , Rfl:     prednisoLONE acetate (PRED FORTE) 1 % DrpS, 1 drop every 4 (four) hours. , Disp: , Rfl:     tamsulosin (FLOMAX) 0.4 mg Cap, Take 1 capsule (0.4 mg total) by mouth once daily., Disp: 90 capsule, Rfl: 3   Assessment:       Patient Active Problem List    Diagnosis Date Noted    Other hyperlipidemia 09/20/2019    Primary generalized (osteo)arthritis 09/20/2019    Carpal tunnel syndrome on left 06/13/2019    Class 3 severe obesity due to excess calories without serious comorbidity with body mass index (BMI) of 50.0 to 59.9 in adult 05/09/2019    Sleep apnea     Hypertension     Depression     Carpal tunnel syndrome on both sides     BPH associated with nocturia     Colon cancer screening 10/02/2018    Impaired fasting glucose 01/01/2013          Plan:       Corey De La O  was seen today for follow-up and may need lab work.    Diagnoses and all orders for this visit:    Diagnoses and all orders for this visit:    Primary hypertension  Continue meds    Essential hypertension  -     amLODIPine (NORVASC) 5 MG tablet; Take 1 tablet (5 mg total) by mouth once daily.  -     losartan (COZAAR) 50 MG tablet; Take 1 tablet (50 mg total) by mouth 2 (two) times a day.  -     metoprolol tartrate (LOPRESSOR) 25 MG tablet; Take 1 tablet (25 mg total) by mouth once daily.  -     CBC Auto Differential; Future  -     Comprehensive Metabolic Panel; Future  Continue meds     Major depressive disorder with single episode, in partial remission  -     DULoxetine (CYMBALTA) 60 MG capsule; TAKE 1 CAPSULE(60 MG) BY MOUTH EVERY EVENING  Controlled    BPH associated with nocturia  -     tamsulosin (FLOMAX) 0.4 mg Cap; Take 1 capsule (0.4 mg total)  by mouth once daily.    Impaired fasting glucose  -     Hemoglobin A1C; Future  Check labs      Prostate cancer screening  -     PSA, Screening; Future    Other hyperlipidemia  -     Lipid Panel; Future  Check labs

## 2022-04-25 ENCOUNTER — TELEPHONE (OUTPATIENT)
Dept: FAMILY MEDICINE | Facility: CLINIC | Age: 72
End: 2022-04-25
Payer: MEDICARE

## 2022-04-25 DIAGNOSIS — B35.4 TINEA CORPORIS: Primary | ICD-10-CM

## 2022-04-25 RX ORDER — NYSTATIN 100000 [USP'U]/G
POWDER TOPICAL 4 TIMES DAILY
Qty: 60 G | Refills: 3 | Status: SHIPPED | OUTPATIENT
Start: 2022-04-25

## 2022-04-25 NOTE — TELEPHONE ENCOUNTER
----- Message from Hawa Palumbo sent at 4/23/2022 11:41 AM CDT -----  Contact: patient  Type:  RX Refill Request    Who Called:  patient  Refill or New Rx:  refill  RX Name and Strength:   anti-fungal powder  How is the patient currently taking it? (ex. 1XDay):  as directed  Is this a 30 day or 90 day RX:  90  Preferred Pharmacy with phone number:    [id number] [place] [address] [address] [place] [address] [address] [place] [id number]  Phone: [phone] Fax: [phone]  Local or Mail Order:  local  Ordering Provider:  Helen Calero Call Back Number:  [phone] (home)   Additional Information:  patient said dr alonso prescribed it for him before he moved to [place]

## 2022-04-25 NOTE — TELEPHONE ENCOUNTER
Called patient, he states Dr. Cervantes prescribed anti fungal, (he thinks maybe nystatin) powder a few years back. He states he is out and has not needed it, but would like some more.

## 2022-05-09 ENCOUNTER — PATIENT MESSAGE (OUTPATIENT)
Dept: SMOKING CESSATION | Facility: CLINIC | Age: 72
End: 2022-05-09
Payer: MEDICARE

## 2022-05-16 ENCOUNTER — TELEPHONE (OUTPATIENT)
Dept: FAMILY MEDICINE | Facility: CLINIC | Age: 72
End: 2022-05-16
Payer: MEDICARE

## 2022-05-16 NOTE — TELEPHONE ENCOUNTER
Spoke with patient, he states he believes he has cellulitis. He has a red, warm to touch spot on his abdomen. He states he gets it from time to time. He had an antibiotic on hand that he has been taking but he is out now. He was requesting one be sent in. Advised him he will need an appointment to have it evaluated. Patient requested a virtual visit. Got him scheduled for 05/17 with ADIS Berkowitz

## 2022-05-16 NOTE — TELEPHONE ENCOUNTER
----- Message from María Gu sent at 5/16/2022  2:29 PM CDT -----  Contact: pt  Type: Needs Medical Advice    Who Called: pt  Best Call Back Number: [phone]  Inquiry/Question: pt calling to see if  can call in antibiotic to pharmacy listed below for recurring cellulitis infection, please advise pt  Thank you~    [id number] - [place] [address] AT [place] [address] & [address] [address]  Phone: [phone] Fax: [phone]

## 2023-03-25 ENCOUNTER — NURSE TRIAGE (OUTPATIENT)
Dept: ADMINISTRATIVE | Facility: CLINIC | Age: 73
End: 2023-03-25
Payer: MEDICARE

## 2023-03-25 NOTE — TELEPHONE ENCOUNTER
[place]    PCP:  Dr. Carlos Cervantes    C/O mental health issues (depression and anxiety).  He reports having trouble leaving the house.  Denies SI/HI.  He reports that he's missing a memorial service for 2 MOF today because he just can't leave the house.  Per protocol, care advised is go to ED now.  Pt states it will be later before he can get someone to drive him to the ED therefore advised if he can't get personal transportation to the hospital now then he needs to call  now.  Advised not to delay care.  Pt VU.  Advised to call for worsening/questions/concerns.  VU.     Reason for Disposition   [1] Depression AND [2] unable to do any of normal activities (e.g., self care, school, work; in comparison to baseline).    Additional Information   Negative: SEVERE difficulty breathing (e.g., struggling for each breath, speaks in single words)   Negative: Bluish (or gray) lips or face now   Negative: Difficult to awaken or acting confused (e.g., disoriented, slurred speech)   Negative: Hysterical or combative behavior   Negative: Sounds like a life-threatening emergency to the triager   Depression is main problem or symptom (e.g., feelings of sadness or hopelessness)   Negative: Patient attempted suicide   Negative: Patient is threatening suicide now   Negative: Violent behavior, or threatening to physically hurt or kill someone   Negative: [1] Patient is very confused (disoriented, slurred speech) AND [2] no other adult (e.g., friend or family member) available   Negative: [1] Difficult to awaken or acting very confused (disoriented, slurred speech) AND [2] new-onset   Negative: Sounds like a life-threatening emergency to the triager    Protocols used: Anxiety and Panic Attack-A-AH, Depression-A-AH

## 2023-03-27 NOTE — TELEPHONE ENCOUNTER
"Spoke with pt at length , pt is refusing to go to Er , states he is ok right now and he is not in a crisis. 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 Pt states he jaswinder call 911 and reach out for help to the triage nurse if he is in a crisis.  "

## 2023-04-19 DIAGNOSIS — I10 ESSENTIAL HYPERTENSION: ICD-10-CM

## 2023-04-19 NOTE — TELEPHONE ENCOUNTER
Refill Routing Note   Medication(s) are not appropriate for processing by [facility] for the following reason(s):      Required vitals outdated    [facility] action(s):  Defer              Appointments  past 12m or future 3m with PCP    Date Provider   Last Visit   4/13/2022 Carlos Cervantes MD   Next Visit   5/23/2023 Carlos Cervantes MD   ED visits in past 90 days: 0        Note composed:12:25 PM 04/19/2023

## 2023-04-19 NOTE — TELEPHONE ENCOUNTER
No new care gaps identified.  [facility] Embedded Care Gaps. Reference number: [id number]. 4/19/2023   10:33:43 AM IZAIAHT

## 2023-04-20 RX ORDER — AMLODIPINE BESYLATE 5 MG/1
TABLET ORAL
Qty: 90 TABLET | Refills: 3 | OUTPATIENT
Start: 2023-04-20

## 2023-04-26 PROBLEM — D64.89 OTHER SPECIFIED ANEMIAS: Status: ACTIVE | Noted: 2023-04-26

## 2023-04-26 PROBLEM — R73.9 HYPERGLYCEMIA: Status: ACTIVE | Noted: 2023-04-26

## 2023-04-26 PROBLEM — I48.91 ATRIAL FIBRILLATION WITH RVR: Status: ACTIVE | Noted: 2023-04-26

## 2023-05-09 ENCOUNTER — TELEPHONE (OUTPATIENT)
Dept: CARDIOLOGY | Facility: CLINIC | Age: 73
End: 2023-05-09
Payer: MEDICARE

## 2023-05-09 PROBLEM — E78.2 MIXED HYPERLIPIDEMIA: Status: ACTIVE | Noted: 2019-09-20

## 2023-05-09 PROBLEM — I48.0 PAROXYSMAL ATRIAL FIBRILLATION: Status: ACTIVE | Noted: 2023-05-09

## 2023-05-09 NOTE — PROGRESS NOTES
"Subjective:    Patient ID:  Croey De La O is a 72 y.o. male who presents for follow-up of Atrial Fibrillation, Hyperlipidemia, and Hypertension      Problem List Items Addressed This Visit          Cardiac/Vascular    Atrial fibrillation with RVR - Primary    Mixed hyperlipidemia    Paroxysmal atrial fibrillation    Primary hypertension       HPI    Patient was most recently seen at [facility] where he underwent ILIANA/cardioversion.  Stated that he has recurred into atrial fibrillation, so presents today for outpatient follow-up.    On assessment today, the patient states that he is back in aFib.    Not feeling well   Having shortness of breath, but not as bad as it was before he went to the hospital.    No chest pain.    EKG today shows aFib with RVR to the 140s  HR recovered back to the 70s with short rest        Objective:     Vitals:    05/10/23 1046   BP: 132/72   BP Location: Left arm   Patient Position: Sitting   BP Method: Large (Automatic)   Pulse: 71   Height: 5' 11" (1.803 m)       BP Readings from Last 5 Encounters:   05/10/23 132/72   05/03/23 121/72   09/20/19 138/78   06/13/19 110/73   05/09/19 139/86        Physical Exam  Constitutional:       Appearance: He is well-developed.   HENT:      Head: Normocephalic and atraumatic.   Neck:      Vascular: No JVD.   Cardiovascular:      Rate and Rhythm: Normal rate. Rhythm irregular.      Heart sounds: Normal heart sounds. No murmur heard.    No friction rub. No gallop.   Pulmonary:      Effort: Pulmonary effort is normal. No respiratory distress.      Breath sounds: Normal breath sounds. No wheezing or rales.   Abdominal:      General: Bowel sounds are normal.      Palpations: Abdomen is soft.      Tenderness: There is no abdominal tenderness. There is no guarding or rebound.   Musculoskeletal:      Cervical back: Normal range of motion and neck supple.   Skin:     General: Skin is warm and dry.   Neurological:      Mental Status: He is alert " and oriented to person, place, and time.   Psychiatric:         Behavior: Behavior normal.           Current Outpatient Medications   Medication Instructions    aspirin (ECOTRIN) 81 mg, Oral, Nightly    b complex vitamins tablet 1 tablet, Oral, Every other day    bismuth subsalicylate (BISMAREX) 524 mg, Oral, Daily PRN    diclofenac (VOLTAREN) 50 MG EC tablet TAKE 2 TABLETS(100 MG) BY MOUTH DAILY AS NEEDED FOR ARTHRITIS OR PAIN    DULoxetine (CYMBALTA) 60 MG capsule TAKE 1 CAPSULE(60 MG) BY MOUTH EVERY EVENING    losartan (COZAAR) 50 mg, Oral, 2 times daily    metoprolol succinate (TOPROL-XL) 50 mg, Oral, 2 times daily    multivitamin (THERAGRAN) per tablet 1 tablet, Oral, Daily    nystatin (MYCOSTATIN) powder Topical (Top), 4 times daily    rivaroxaban (XARELTO) 20 mg, Oral, With dinner    sennosides (LAXATIVE ORAL) 2 tablets, Oral, Daily PRN    soft lens adjunctive solutions (SALINE OPHT) 1 drop, Both Eyes, 3 times daily    tamsulosin (FLOMAX) 0.4 mg, Oral, Daily    torsemide 40 mg, Oral, Daily       Lipid Panel:   Lab Results   Component Value Date    CHOL 186 04/27/2023    HDL 33 (L) 04/27/2023    LDLCALC 120.6 04/27/2023    TRIG 162 (H) 04/27/2023    CHOLHDL 17.7 (L) 04/27/2023       The 10-year ASCVD risk score (Christos RUBIO, et al., 2019) is: 26.6%    Values used to calculate the score:      Age: 72 years      Sex: Male      Is Non- : No      Diabetic: No      Tobacco smoker: No      Systolic Blood Pressure: 132 mmHg      Is BP treated: Yes      HDL Cholesterol: 33 mg/dL      Total Cholesterol: 186 mg/dL    All pertinent labs, imaging, and EKGs reviewed.  Patient's most recent EKG tracing was personally interpreted by this provider.    Most Recent EKG Results  Results for orders placed or performed during the hospital encounter of 04/26/23   EKG 12-LEAD    Collection Time: 05/02/23  2:30 PM    Narrative    Test Reason : I48.91,    Vent. Rate : 077 BPM     Atrial Rate : 077 BPM     P-R Int  : 218 ms          QRS Dur : 102 ms      QT Int : 376 ms       P-R-T Axes : 078 -41 068 degrees     QTc Int : 425 ms    Sinus rhythm with 1st degree A-V block with Premature atrial complexes  Left axis deviation  Minimal voltage criteria for LVH, may be normal variant ( Nickolas product )  Inferior infarct ,age undetermined  Anterolateral infarct (cited on or before 26-APR-2023)  Abnormal ECG  When compared with ECG of 26-APR-2023 10:50,  Sinus rhythm has replaced Atrial fibrillation  Vent. rate has decreased BY  63 BPM  Inferior infarct is now Present  Serial changes of Anterior infarct Present    Referred By: LOREN   SELF           Confirmed By:        Most Recent Echocardiogram Results  Results for orders placed during the hospital encounter of 04/26/23    Transesophageal echo (ILIANA) with possible cardioversion    Interpretation Summary  · Normal systolic function.  · Normal right ventricular size with normal right ventricular systolic function.  · Normal appearing left atrial appendage. No thrombus is present in the appendage. Normal appendage velocities.  · A 200 J synchronized cardioversion was successfully performed with restoration of normal sinus rhythm.      Most Recent Nuclear Stress Test Results  No results found for this or any previous visit.      Most Recent Cardiac PET Stress Test Results  No results found for this or any previous visit.      Most Recent Cardiovascular Angiogram results  No results found for this or any previous visit.      Other Most Recent Cardiology Results  Results for orders placed during the hospital encounter of 04/26/23    CARDIAC MONITORING STRIPS        Assessment:       1. Atrial fibrillation with RVR    2. Mixed hyperlipidemia    3. Primary hypertension    4. Paroxysmal atrial fibrillation         Plan:     Symptoms of shortness of breath, some RVR episodes, but HR recovers  BP OK, pulse dramatically elevated   Most recent echocardiogram reviewed personally   Symptoms of  sleep apnea - fatigue and snoring    Rhythm - aFib  Got new CPAP mask yesterday    CBC today to check blood count   Emphasize CPAP use  Start amiodarone 400 mg PO BID followed by 200 mg PO Daily   Schedule outpatient ILIANA cardioversion after amiodarone load   This procedure has been fully reviewed with the patient.   Continue losartan 50 mg PO Daily   Continue metoprolol succinate 50 mg PO BID  Continue Xarelto 20 mg PO Daily to be taken with the largest meal of the day   Continue aspirin 81 mg PO Daily  Strict ED precautions given    Continue other cardiac medications  Mediterranean Diet/Cardiovascular Exercise Program    Patient queried and all questions were answered.    F/u in 6-8 weeks to reassess      Signed:    Hong Fallon MD  5/10/2023 12:27 PM

## 2023-05-09 NOTE — TELEPHONE ENCOUNTER
----- Message from Ashley Crook sent at 5/9/2023  8:58 AM CDT -----  Contact: self  Type:  Needs Medical Advice    Who Called: Pt    Would the patient rather a call back or a response via [facility]? call  Best Call Back Number: [phone]    Additional Information: The pt has some questions regarding his Atrial fibrillation with RVR... Please call to advise... Thank you...        
Please advise: pt believes he may  be back in a fib (you did cardioversion 5/2) he has a pulse ox and the heart rate is jumping around (); he is short of breath with exertion, not as bad as when he was admitted but definitely worse than when he was discharged; he is scheduled to see Glenis 5/31  
Scheduled appt for tomorrow with Dr Fallon  
n/a

## 2023-05-10 ENCOUNTER — OFFICE VISIT (OUTPATIENT)
Dept: CARDIOLOGY | Facility: CLINIC | Age: 73
End: 2023-05-10
Payer: MEDICARE

## 2023-05-10 ENCOUNTER — LAB VISIT (OUTPATIENT)
Dept: LAB | Facility: HOSPITAL | Age: 73
End: 2023-05-10
Attending: INTERNAL MEDICINE
Payer: MEDICARE

## 2023-05-10 VITALS
HEART RATE: 71 BPM | DIASTOLIC BLOOD PRESSURE: 72 MMHG | HEIGHT: 71 IN | BODY MASS INDEX: 54.55 KG/M2 | SYSTOLIC BLOOD PRESSURE: 132 MMHG

## 2023-05-10 DIAGNOSIS — R31.9 HEMATURIA, UNSPECIFIED TYPE: ICD-10-CM

## 2023-05-10 DIAGNOSIS — I48.91 ATRIAL FIBRILLATION WITH RVR: Primary | ICD-10-CM

## 2023-05-10 DIAGNOSIS — I48.0 PAROXYSMAL ATRIAL FIBRILLATION: ICD-10-CM

## 2023-05-10 DIAGNOSIS — I10 PRIMARY HYPERTENSION: ICD-10-CM

## 2023-05-10 DIAGNOSIS — E78.2 MIXED HYPERLIPIDEMIA: ICD-10-CM

## 2023-05-10 DIAGNOSIS — I48.91 ATRIAL FIBRILLATION WITH RVR: ICD-10-CM

## 2023-05-10 LAB
BASOPHILS # BLD AUTO: 0.12 K/UL (ref 0–0.2)
BASOPHILS NFR BLD: 1.2 % (ref 0–1.9)
DIFFERENTIAL METHOD: ABNORMAL
EOSINOPHIL # BLD AUTO: 0.3 K/UL (ref 0–0.5)
EOSINOPHIL NFR BLD: 2.6 % (ref 0–8)
ERYTHROCYTE [DISTWIDTH] IN BLOOD BY AUTOMATED COUNT: 16.9 % (ref 11.5–14.5)
HCT VFR BLD AUTO: 41 % (ref 40–54)
HGB BLD-MCNC: 12.1 G/DL (ref 14–18)
IMM GRANULOCYTES # BLD AUTO: 0.03 K/UL (ref 0–0.04)
IMM GRANULOCYTES NFR BLD AUTO: 0.3 % (ref 0–0.5)
LYMPHOCYTES # BLD AUTO: 2.2 K/UL (ref 1–4.8)
LYMPHOCYTES NFR BLD: 20.8 % (ref 18–48)
MCH RBC QN AUTO: 25.2 PG (ref 27–31)
MCHC RBC AUTO-ENTMCNC: 29.5 G/DL (ref 32–36)
MCV RBC AUTO: 85 FL (ref 82–98)
MONOCYTES # BLD AUTO: 0.8 K/UL (ref 0.3–1)
MONOCYTES NFR BLD: 7.6 % (ref 4–15)
NEUTROPHILS # BLD AUTO: 7 K/UL (ref 1.8–7.7)
NEUTROPHILS NFR BLD: 67.5 % (ref 38–73)
NRBC BLD-RTO: 0 /100 WBC
PLATELET # BLD AUTO: 621 K/UL (ref 150–450)
PMV BLD AUTO: 9.1 FL (ref 9.2–12.9)
RBC # BLD AUTO: 4.8 M/UL (ref 4.6–6.2)
WBC # BLD AUTO: 10.42 K/UL (ref 3.9–12.7)

## 2023-05-10 PROCEDURE — 93005 ELECTROCARDIOGRAM TRACING: CPT | Mod: PO

## 2023-05-10 PROCEDURE — 85025 COMPLETE CBC W/AUTO DIFF WBC: CPT | Performed by: INTERNAL MEDICINE

## 2023-05-10 PROCEDURE — 99215 OFFICE O/P EST HI 40 MIN: CPT | Mod: S$PBB,25,, | Performed by: INTERNAL MEDICINE

## 2023-05-10 PROCEDURE — 99215 PR OFFICE/OUTPT VISIT, EST, LEVL V, 40-54 MIN: ICD-10-PCS | Mod: S$PBB,25,, | Performed by: INTERNAL MEDICINE

## 2023-05-10 PROCEDURE — 36415 COLL VENOUS BLD VENIPUNCTURE: CPT | Mod: PO | Performed by: INTERNAL MEDICINE

## 2023-05-10 PROCEDURE — 93010 ELECTROCARDIOGRAM REPORT: CPT | Mod: ,,, | Performed by: INTERNAL MEDICINE

## 2023-05-10 PROCEDURE — 99999 PR PBB SHADOW E&M-EST. PATIENT-LVL III: CPT | Mod: PBBFAC,,, | Performed by: INTERNAL MEDICINE

## 2023-05-10 PROCEDURE — 99999 PR PBB SHADOW E&M-EST. PATIENT-LVL III: ICD-10-PCS | Mod: PBBFAC,,, | Performed by: INTERNAL MEDICINE

## 2023-05-10 PROCEDURE — 93010 EKG 12-LEAD: ICD-10-PCS | Mod: ,,, | Performed by: INTERNAL MEDICINE

## 2023-05-10 PROCEDURE — 99213 OFFICE O/P EST LOW 20 MIN: CPT | Mod: PBBFAC,PO | Performed by: INTERNAL MEDICINE

## 2023-05-10 RX ORDER — AMIODARONE HYDROCHLORIDE 200 MG/1
TABLET ORAL
Qty: 90 TABLET | Refills: 3 | Status: ON HOLD | OUTPATIENT
Start: 2023-05-10 | End: 2023-06-02 | Stop reason: SDUPTHER

## 2023-05-10 NOTE — PATIENT INSTRUCTIONS
Cardioversion 5/30 at 12    Arrive for your procedure at:  [facility]      FASTING:  You MAY NOT have anything to eat or drink AFTER MIDNIGHT.  If your procedure is scheduled in the afternoon, you may have a LIGHT BREAKFAST BEFORE 6:00 A.M.  For example: Two slices of toast; black coffee or black tea.    MEDICATIONS:  You may take your regular morning medications with a small sip of water.     Hold or adjust the following:  Fluid pills.  Diabetes medications.    Continue: Coumadin, Plavix, Effient, Aspirin, Anti-coagulants, Blood thinners    Please refer to pre-op instructions received from [facility].

## 2023-05-21 DIAGNOSIS — F32.4 MAJOR DEPRESSIVE DISORDER WITH SINGLE EPISODE, IN PARTIAL REMISSION: ICD-10-CM

## 2023-05-21 NOTE — TELEPHONE ENCOUNTER
No care due was identified.  [facility] Embedded Care Due Messages. Reference number: [id number].   5/21/2023 10:21:30 AM CDT

## 2023-05-22 RX ORDER — DULOXETIN HYDROCHLORIDE 60 MG/1
CAPSULE, DELAYED RELEASE ORAL
Qty: 90 CAPSULE | Refills: 3 | Status: SHIPPED | OUTPATIENT
Start: 2023-05-22

## 2023-06-20 PROBLEM — I48.91 ATRIAL FIBRILLATION WITH RVR: Status: RESOLVED | Noted: 2023-04-26 | Resolved: 2023-06-20

## 2023-06-22 ENCOUNTER — PATIENT MESSAGE (OUTPATIENT)
Dept: CARDIOLOGY | Facility: CLINIC | Age: 73
End: 2023-06-22
Payer: MEDICARE

## 2023-06-22 NOTE — PROGRESS NOTES
Subjective:    Patient ID:  Corey De La O is a 72 y.o. male who presents for follow-up of No chief complaint on file.      TELEMEDICINE VISIT      Problem List Items Addressed This Visit          Cardiac/Vascular    Mixed hyperlipidemia - Primary    Paroxysmal atrial fibrillation    Overview     ILIANA cardioversion 05/02/2023  Repeat cardioversion 06/02/2023 after load of amiodarone         Primary hypertension       HPI    Patient was last seen on 05/10/2023 at which time he was evaluated for AFib with RVR.  Amiodarone was started and repeat cardioversion was scheduled and completed successfully as detailed above.    Patient presents for telemedicine visit.    On assessment today, the patient states that he is doing fairly well.    No chest pain.  How is CPAP going - Sort of tolerating it, feels like he is getting some benefit  Frequency of torsemide - Once daily, not needing extra    Currently in sinus rhythm  Still with occasional shortness of breath         Objective:   There were no vitals filed for this visit.    BP Readings from Last 5 Encounters:   06/02/23 (!) 126/59   05/10/23 132/72   05/03/23 121/72   09/20/19 138/78   06/13/19 110/73        Physical Exam  Constitutional:       General: He is not in acute distress.     Appearance: He is well-developed. He is not diaphoretic.   HENT:      Head: Normocephalic and atraumatic.   Eyes:      General: No scleral icterus.     Conjunctiva/sclera: Conjunctivae normal.   Pulmonary:      Effort: No respiratory distress.      Breath sounds: No stridor.   Musculoskeletal:         General: No signs of injury.      Cervical back: Normal range of motion.   Skin:     Coloration: Skin is not jaundiced.   Neurological:      Mental Status: He is alert. Mental status is at baseline.   Psychiatric:         Mood and Affect: Mood normal.         Behavior: Behavior normal.           Current Outpatient Medications   Medication Instructions    amiodarone (PACERONE) 200 mg, Oral,  Daily    aspirin (ECOTRIN) 81 mg, Oral, Nightly    b complex vitamins tablet 1 tablet, Oral, Every other day    bismuth subsalicylate (BISMAREX) 524 mg, Oral, Daily PRN    diclofenac (VOLTAREN) 50 MG EC tablet TAKE 2 TABLETS(100 MG) BY MOUTH DAILY AS NEEDED FOR ARTHRITIS OR PAIN    DULoxetine (CYMBALTA) 60 MG capsule TAKE 1 CAPSULE(60 MG) BY MOUTH EVERY EVENING    losartan (COZAAR) 50 mg, Oral, 2 times daily    metoprolol succinate (TOPROL-XL) 50 mg, Oral, 2 times daily    multivitamin (THERAGRAN) per tablet 1 tablet, Oral, Daily    nystatin (MYCOSTATIN) powder Topical (Top), 4 times daily    rivaroxaban (XARELTO) 20 mg, Oral, With dinner    sennosides (LAXATIVE ORAL) 2 tablets, Oral, Daily PRN    soft lens adjunctive solutions (SALINE OPHT) 1 drop, Both Eyes, 3 times daily    tamsulosin (FLOMAX) 0.4 mg, Oral, Daily    torsemide 40 mg, Oral, Daily       Lipid Panel:   Lab Results   Component Value Date    CHOL 186 04/27/2023    HDL 33 (L) 04/27/2023    LDLCALC 120.6 04/27/2023    TRIG 162 (H) 04/27/2023    CHOLHDL 17.7 (L) 04/27/2023       The 10-year ASCVD risk score (Christos DK, et al., 2019) is: 24.8%    Values used to calculate the score:      Age: 72 years      Sex: Male      Is Non- : No      Diabetic: No      Tobacco smoker: No      Systolic Blood Pressure: 126 mmHg      Is BP treated: Yes      HDL Cholesterol: 33 mg/dL      Total Cholesterol: 186 mg/dL    All pertinent labs, imaging, and EKGs reviewed.  Patient's most recent EKG tracing was personally interpreted by this provider.    Most Recent EKG Results  Results for orders placed or performed during the hospital encounter of 06/02/23   EKG 12-LEAD    Collection Time: 06/02/23 11:32 AM    Narrative    Test Reason : I48.0,    Vent. Rate : 064 BPM     Atrial Rate : 064 BPM     P-R Int : 208 ms          QRS Dur : 100 ms      QT Int : 382 ms       P-R-T Axes : 030 -32 018 degrees     QTc Int : 394 ms    Normal sinus rhythm  Left axis  deviation  Inferior infarct (cited on or before 02-MAY-2023)  Anterolateral infarct (cited on or before 26-APR-2023)  Abnormal ECG  When compared with ECG of 10-MAY-2023 10:41,  Sinus rhythm has replaced Atrial fibrillation  Vent. rate has decreased BY  79 BPM  Nonspecific T wave abnormality now evident in Inferior leads  Confirmed by Konstantin Soriano ([id number]) on 6/5/2023 3:12:18 PM    Referred By:             Confirmed By:Konstantin Soriano       Most Recent Echocardiogram Results  Results for orders placed during the hospital encounter of 06/02/23    Transesophageal echo (ILIANA) with possible cardioversion    Interpretation Summary  · Normal left ventricular diastolic function.  · Low normal systolic function.  · Normal appearing left atrial appendage. No thrombus is present in the appendage. Normal appendage velocities.  · A 200 J synchronized cardioversion was successfully performed with restoration of normal sinus rhythm.  · Study truncated secondary to hypoxemia      Most Recent Nuclear Stress Test Results  No results found for this or any previous visit.      Most Recent Cardiac PET Stress Test Results  No results found for this or any previous visit.      Most Recent Cardiovascular Angiogram results  No results found for this or any previous visit.      Other Most Recent Cardiology Results  Results for orders placed during the hospital encounter of 04/26/23    CARDIAC MONITORING STRIPS      Assessment:       1. Mixed hyperlipidemia    2. Paroxysmal atrial fibrillation    3. Primary hypertension         Plan:     Symptoms OK today, improving with regular rhythm and CPAP  BP/Pulse unable to be assessed on telemedicine visit  Most recent echocardiogram reviewed personally      Continue amiodarone 200 mg PO Daily   Continue aspirin 81 mg PO Daily  Continue losartan 50 mg PO Daily   Continue metoprolol succinate 50 mg PO BID  Continue Xarelto 20 mg PO Daily to be taken with the largest meal of the day   Continue  torsemide 40 mg PO Daily   BMP/Mag     Continue other cardiac medications  Mediterranean Diet/Cardiovascular Exercise Program    Patient queried and all questions were answered.    F/u in 6 months to reassess    The patient location is: Home   The chief complaint leading to consultation is:  Please see problem list above  Visit type: Virtual visit with synchronous audio and video  Total time spent with patient: 15 minutes   Each patient to whom he or she provides medical services by telemedicine is:  (1) informed of the relationship between the physician and patient and the respective role of any other health care provider with respect to management of the patient; and (2) notified that he or she may decline to receive medical services by telemedicine and may withdraw from such care at any time.    Notes: See above       Signed:    Hong Fallon MD  6/22/2023 8:01 AM

## 2023-06-23 ENCOUNTER — OFFICE VISIT (OUTPATIENT)
Dept: CARDIOLOGY | Facility: CLINIC | Age: 73
End: 2023-06-23
Payer: MEDICARE

## 2023-06-23 DIAGNOSIS — I48.0 PAROXYSMAL ATRIAL FIBRILLATION: ICD-10-CM

## 2023-06-23 DIAGNOSIS — E78.2 MIXED HYPERLIPIDEMIA: Primary | ICD-10-CM

## 2023-06-23 DIAGNOSIS — I10 PRIMARY HYPERTENSION: ICD-10-CM

## 2023-06-23 PROCEDURE — 99214 PR OFFICE/OUTPT VISIT, EST, LEVL IV, 30-39 MIN: ICD-10-PCS | Mod: 95,,, | Performed by: INTERNAL MEDICINE

## 2023-06-23 PROCEDURE — 99214 OFFICE O/P EST MOD 30 MIN: CPT | Mod: 95,,, | Performed by: INTERNAL MEDICINE

## 2023-06-24 ENCOUNTER — PATIENT MESSAGE (OUTPATIENT)
Dept: CARDIOLOGY | Facility: CLINIC | Age: 73
End: 2023-06-24
Payer: MEDICARE

## 2023-07-06 DIAGNOSIS — I48.0 PAROXYSMAL ATRIAL FIBRILLATION: Primary | ICD-10-CM

## 2023-07-22 PROBLEM — D64.9 SYMPTOMATIC ANEMIA: Status: ACTIVE | Noted: 2023-04-26

## 2023-07-24 PROBLEM — R31.0 GROSS HEMATURIA: Status: ACTIVE | Noted: 2023-07-24

## 2023-07-24 PROBLEM — N17.9 AKI (ACUTE KIDNEY INJURY): Status: ACTIVE | Noted: 2023-07-24

## 2023-07-24 PROBLEM — N30.01 ACUTE CYSTITIS WITH HEMATURIA: Status: ACTIVE | Noted: 2023-07-24

## 2023-07-25 PROBLEM — N32.89 BLADDER DISTENSION: Status: ACTIVE | Noted: 2023-07-25

## 2023-08-29 DIAGNOSIS — R35.1 BPH ASSOCIATED WITH NOCTURIA: ICD-10-CM

## 2023-08-29 DIAGNOSIS — N40.1 BPH ASSOCIATED WITH NOCTURIA: ICD-10-CM

## 2023-08-29 NOTE — TELEPHONE ENCOUNTER
----- Message from Diana Sahu sent at 8/29/2023  9:10 AM CDT -----  Regarding: callback for meds  Type:  Needs Medical Advice    Who Called: Pt        Pharmacy name and phone #:    [id number] - [place] [address] [facility] [address]  [address] [place] [id number]  Phone: [phone] Fax: [phone]      Would the patient rather a call back or a response via [facility]? Callback    Best Call Back Number: [phone]    Additional Information: Pt would like a call back about his meds. Please advise ----------------- thank you

## 2023-08-29 NOTE — TELEPHONE ENCOUNTER
No care due was identified.  [facility] Embedded Care Due Messages. Reference number: [id number].   8/29/2023 1:41:25 PM CDT   Patient is interested in increasing Ozempic for weight loss  I will confirm with pharmacist about dosing and get back to patient

## 2023-08-29 NOTE — TELEPHONE ENCOUNTER
Spoke with patient in regards to needing Rx refill. Informed patient that I have pended the orders for approval. Patient understood.

## 2023-08-29 NOTE — TELEPHONE ENCOUNTER
Refill Routing Note   Medication(s) are not appropriate for processing by [facility] for the following reason(s):      Patient not seen by provider within 15 months: future office visit 9/18/23  Required labs abnormal  No active prescription written by provider: previously prescribed by ED provider    [facility] action(s):  Defer Care Due:  None identified            Appointments  past 12m or future 3m with PCP    Date Provider   Last Visit   4/13/2022 Carlos Cervantes MD   Next Visit   9/18/2023 Carlos Cervantes MD   ED visits in past 90 days: 0        Note composed:4:51 PM 08/29/2023

## 2023-08-30 ENCOUNTER — PATIENT MESSAGE (OUTPATIENT)
Dept: FAMILY MEDICINE | Facility: CLINIC | Age: 73
End: 2023-08-30
Payer: MEDICARE

## 2023-08-30 RX ORDER — FAMOTIDINE 20 MG/1
20 TABLET, FILM COATED ORAL DAILY
Qty: 30 TABLET | Refills: 0 | Status: SHIPPED | OUTPATIENT
Start: 2023-08-30 | End: 2023-08-31

## 2023-08-30 RX ORDER — FINASTERIDE 5 MG/1
5 TABLET, FILM COATED ORAL DAILY
Qty: 30 TABLET | Refills: 0 | Status: SHIPPED | OUTPATIENT
Start: 2023-08-30 | End: 2023-09-22

## 2023-08-30 RX ORDER — TAMSULOSIN HYDROCHLORIDE 0.4 MG/1
0.4 CAPSULE ORAL NIGHTLY
Qty: 30 CAPSULE | Refills: 0 | Status: SHIPPED | OUTPATIENT
Start: 2023-08-30 | End: 2023-09-26

## 2023-08-30 NOTE — TELEPHONE ENCOUNTER
No care due was identified.  [facility] Embedded Care Due Messages. Reference number: [id number].   8/30/2023 11:45:27 AM CDT

## 2023-08-31 RX ORDER — FAMOTIDINE 20 MG/1
20 TABLET, FILM COATED ORAL
Qty: 90 TABLET | Refills: 3 | Status: SHIPPED | OUTPATIENT
Start: 2023-08-31

## 2023-09-05 DIAGNOSIS — I48.0 PAROXYSMAL ATRIAL FIBRILLATION: Primary | ICD-10-CM

## 2023-09-05 NOTE — TELEPHONE ENCOUNTER
----- Message from Jeffrey Griffin sent at 9/5/2023  1:26 PM CDT -----  Contact: pt  Type: Needs Medical Advice  Who Called:  pt  Best Call Back Number: [phone]    Additional Information: Pt is calling the office has a few questions about prescription that was cancelled.Please call back and advise.

## 2023-09-06 RX ORDER — AMIODARONE HYDROCHLORIDE 200 MG/1
200 TABLET ORAL DAILY
Qty: 90 TABLET | Refills: 3 | Status: SHIPPED | OUTPATIENT
Start: 2023-09-06

## 2023-09-15 ENCOUNTER — TELEPHONE (OUTPATIENT)
Dept: FAMILY MEDICINE | Facility: CLINIC | Age: 73
End: 2023-09-15
Payer: MEDICARE

## 2023-09-15 NOTE — TELEPHONE ENCOUNTER
----- Message from Eddie Ryan sent at 9/13/2023  4:13 PM CDT -----  Regarding: advice  Contact: NATALIA PUENTE [[id number]]  Type: Needs Medical Advice  Who Called:  Jose Alberto Hurd     Symptoms (please be specific):  na    How long has patient had these symptoms:  maricarmen    Pharmacy name and phone #:  na    Best Call Back Number: [phone]    Additional Information: Pt is stable and Jose Alberto will be discharging next week. Please call to advise.

## 2023-09-18 ENCOUNTER — PATIENT MESSAGE (OUTPATIENT)
Dept: FAMILY MEDICINE | Facility: CLINIC | Age: 73
End: 2023-09-18

## 2023-09-18 ENCOUNTER — PATIENT MESSAGE (OUTPATIENT)
Dept: ADMINISTRATIVE | Facility: OTHER | Age: 73
End: 2023-09-18
Payer: MEDICARE

## 2023-09-18 ENCOUNTER — OFFICE VISIT (OUTPATIENT)
Dept: FAMILY MEDICINE | Facility: CLINIC | Age: 73
End: 2023-09-18
Payer: MEDICARE

## 2023-09-18 DIAGNOSIS — F32.4 MAJOR DEPRESSIVE DISORDER WITH SINGLE EPISODE, IN PARTIAL REMISSION: ICD-10-CM

## 2023-09-18 DIAGNOSIS — D62 ACUTE ON CHRONIC BLOOD LOSS ANEMIA: Primary | ICD-10-CM

## 2023-09-18 DIAGNOSIS — R31.0 GROSS HEMATURIA: ICD-10-CM

## 2023-09-18 DIAGNOSIS — Z79.899 ON AMIODARONE THERAPY: ICD-10-CM

## 2023-09-18 DIAGNOSIS — E78.2 MIXED HYPERLIPIDEMIA: ICD-10-CM

## 2023-09-18 DIAGNOSIS — N17.9 AKI (ACUTE KIDNEY INJURY): ICD-10-CM

## 2023-09-18 DIAGNOSIS — I48.0 PAROXYSMAL ATRIAL FIBRILLATION: ICD-10-CM

## 2023-09-18 DIAGNOSIS — I10 PRIMARY HYPERTENSION: ICD-10-CM

## 2023-09-18 DIAGNOSIS — E66.01 MORBID OBESITY: ICD-10-CM

## 2023-09-18 DIAGNOSIS — R73.01 IMPAIRED FASTING GLUCOSE: ICD-10-CM

## 2023-09-18 DIAGNOSIS — Z12.5 PROSTATE CANCER SCREENING: ICD-10-CM

## 2023-09-18 PROBLEM — N32.89 BLADDER DISTENSION: Status: RESOLVED | Noted: 2023-07-25 | Resolved: 2023-09-18

## 2023-09-18 PROBLEM — R73.9 HYPERGLYCEMIA: Status: RESOLVED | Noted: 2023-04-26 | Resolved: 2023-09-18

## 2023-09-18 PROCEDURE — 99213 PR OFFICE/OUTPT VISIT, EST, LEVL III, 20-29 MIN: ICD-10-PCS | Mod: 95,,, | Performed by: INTERNAL MEDICINE

## 2023-09-18 PROCEDURE — 99213 OFFICE O/P EST LOW 20 MIN: CPT | Mod: 95,,, | Performed by: INTERNAL MEDICINE

## 2023-09-18 NOTE — PROGRESS NOTES
[facility]   [address] [facility] [address]       Patient ID: Corey De La O     Chief Complaint: Follow up anemia    The patient location is: [place]   The chief complaint leading to consultation is: Follow up anemia    Visit type: audiovisual    Face to Face time with patient: 11 minutes  15 minutes of total time spent on the encounter, which includes face to face time and non-face to face time preparing to see the patient (eg, review of tests), Obtaining and/or reviewing separately obtained history, Documenting clinical information in the electronic or other health record, Independently interpreting results (not separately reported) and communicating results to the patient/family/caregiver, or Care coordination (not separately reported).         Each patient to whom he or she provides medical services by telemedicine is:  (1) informed of the relationship between the physician and patient and the respective role of any other health care provider with respect to management of the patient; and (2) notified that he or she may decline to receive medical services by telemedicine and may withdraw from such care at any time.    Notes:       HPI: Follow up for anemia. He was admitted to [facility] for weakness due to anemia and was found to have a lot of gross hematuria since starting Xarelto for A-fib. Xarelto and ASA stopped, he was transfused PRBCs and cysto didn't show any concerning lesions. Will Recheck labs soon and include other labs as well. Immunizations discussed.     Review of Systems       Hematuria Resolved     Objective:      Physical Exam   Physical Exam       Virtual Visit     Vitals: There were no vitals filed for this visit.     Assessment:           Plan:       Corey De La O  was seen today for follow-up and may need lab work.    Diagnoses and all orders for this visit:    Diagnoses and all orders for this visit:    Acute on chronic blood loss anemia  -     CBC Auto Differential;  Future  -     Iron and TIBC; Future  -     Ferritin; Future  Check labs      Prostate cancer screening  -     PSA, Screening; Future  Check labs      CESIA (acute kidney injury)  -     Comprehensive Metabolic Panel; Future  Check labs      On amiodarone therapy  -     TSH; Future  Check labs      Mixed hyperlipidemia  -     Lipid Panel; Future  Check labs      Impaired fasting glucose  -     Hemoglobin A1C; Future  Check labs      Major depressive disorder with single episode, in partial remission  Controlled with med     Primary hypertension  Monitor     Paroxysmal atrial fibrillation  Heart rate Controlled - does he need ASA?     Gross hematuria  Resolved     Morbid obesity  Monitor          Carlos Cervantes MD

## 2023-09-19 ENCOUNTER — TELEPHONE (OUTPATIENT)
Dept: CARDIOLOGY | Facility: CLINIC | Age: 73
End: 2023-09-19
Payer: MEDICARE

## 2023-09-19 NOTE — TELEPHONE ENCOUNTER
----- Message from Hong Fallon MD sent at 9/19/2023  4:05 PM CDT -----  Can you get the patient in to see me within the next 4 weeks to discuss rossy, double book OK    -Hong  ----- Message -----  From: Carlos Cervantes MD  Sent: 9/18/2023   5:10 PM CDT  To: Hong Fallon MD    Great idea. Can your office schedule a Follow up?     ----- Message -----  From: Hong Fallon MD  Sent: 9/18/2023   3:48 PM CDT  To: Carlos Cervantes MD    No, not for the AFib, but we can consider him for Watchman now.  ----- Message -----  From: Carlos Cervantes MD  Sent: 9/18/2023   3:03 PM CDT  To: Hong Fallon MD    Xarelto stopped because of hematuria. Should he be on ASA?

## 2023-09-21 NOTE — TELEPHONE ENCOUNTER
Refill Routing Note   Medication(s) are not appropriate for processing by [facility] for the following reason(s):      New or recently adjusted medication    [facility] action(s):  Defer Care Due:  None identified            Appointments  past 12m or future 3m with PCP    Date Provider   Last Visit   9/18/2023 Carlos Cervantes MD   Next Visit   Visit date not found Carlos Cervantes MD   ED visits in past 90 days: 0        Note composed:9:43 AM 09/21/2023

## 2023-09-21 NOTE — TELEPHONE ENCOUNTER
No care due was identified.  [facility] Embedded Care Due Messages. Reference number: [id number].   9/21/2023 8:55:43 AM CDT

## 2023-09-22 RX ORDER — FINASTERIDE 5 MG/1
TABLET, FILM COATED ORAL
Qty: 90 TABLET | Refills: 3 | Status: SHIPPED | OUTPATIENT
Start: 2023-09-22

## 2023-09-26 DIAGNOSIS — R35.1 BPH ASSOCIATED WITH NOCTURIA: ICD-10-CM

## 2023-09-26 DIAGNOSIS — N40.1 BPH ASSOCIATED WITH NOCTURIA: ICD-10-CM

## 2023-09-26 RX ORDER — TAMSULOSIN HYDROCHLORIDE 0.4 MG/1
CAPSULE ORAL
Qty: 90 CAPSULE | Refills: 3 | Status: SHIPPED | OUTPATIENT
Start: 2023-09-26

## 2023-09-27 NOTE — TELEPHONE ENCOUNTER
Refill Decision Note   Corey De La O  is requesting a refill authorization.  Brief Assessment and Rationale for Refill:  Approve     Medication Therapy Plan:  order matches      Comments:     No Care Gaps recommended.     Note composed:9:24 PM 09/26/2023

## 2023-09-27 NOTE — TELEPHONE ENCOUNTER
No care due was identified.  [facility] Embedded Care Due Messages. Reference number: [id number].   9/26/2023 8:45:53 PM CDT

## 2023-10-03 ENCOUNTER — TELEPHONE (OUTPATIENT)
Dept: CARDIOLOGY | Facility: CLINIC | Age: 73
End: 2023-10-03
Payer: MEDICARE

## 2023-10-03 NOTE — TELEPHONE ENCOUNTER
----- Message from Essencered Keila sent at 10/3/2023  8:25 AM CDT -----  Regarding: Pt Advice  Needs Medical Advice      Who Called:  Pt      Would the patient rather a call back or a response via [facility]?  Call back    Best Call Back Number:  [phone]      Additional Information: Sts wanting to inform the office that he canceled appointment for today due to internal gas issues. Has already rescheduled himself and would like to be seen sooner if possible that scheduled appt.   Please Advise - Thank you

## 2023-10-23 PROBLEM — N17.9 AKI (ACUTE KIDNEY INJURY): Status: RESOLVED | Noted: 2023-07-24 | Resolved: 2023-10-23

## 2023-11-06 ENCOUNTER — OFFICE VISIT (OUTPATIENT)
Dept: HOME HEALTH SERVICES | Facility: CLINIC | Age: 73
End: 2023-11-06
Payer: MEDICARE

## 2023-11-06 ENCOUNTER — PATIENT MESSAGE (OUTPATIENT)
Dept: FAMILY MEDICINE | Facility: CLINIC | Age: 73
End: 2023-11-06
Payer: MEDICARE

## 2023-11-06 ENCOUNTER — TELEPHONE (OUTPATIENT)
Dept: FAMILY MEDICINE | Facility: CLINIC | Age: 73
End: 2023-11-06
Payer: MEDICARE

## 2023-11-06 VITALS
SYSTOLIC BLOOD PRESSURE: 135 MMHG | BODY MASS INDEX: 44.1 KG/M2 | DIASTOLIC BLOOD PRESSURE: 75 MMHG | OXYGEN SATURATION: 97 % | HEIGHT: 71 IN | WEIGHT: 315 LBS | HEART RATE: 70 BPM

## 2023-11-06 DIAGNOSIS — G47.33 OBSTRUCTIVE SLEEP APNEA SYNDROME: ICD-10-CM

## 2023-11-06 DIAGNOSIS — N40.1 BPH ASSOCIATED WITH NOCTURIA: ICD-10-CM

## 2023-11-06 DIAGNOSIS — R73.01 IMPAIRED FASTING GLUCOSE: ICD-10-CM

## 2023-11-06 DIAGNOSIS — E78.2 MIXED HYPERLIPIDEMIA: ICD-10-CM

## 2023-11-06 DIAGNOSIS — I10 PRIMARY HYPERTENSION: ICD-10-CM

## 2023-11-06 DIAGNOSIS — D62 ACUTE ON CHRONIC BLOOD LOSS ANEMIA: ICD-10-CM

## 2023-11-06 DIAGNOSIS — K21.9 GASTROESOPHAGEAL REFLUX DISEASE WITHOUT ESOPHAGITIS: ICD-10-CM

## 2023-11-06 DIAGNOSIS — R35.1 BPH ASSOCIATED WITH NOCTURIA: ICD-10-CM

## 2023-11-06 DIAGNOSIS — E66.01 MORBID OBESITY: ICD-10-CM

## 2023-11-06 DIAGNOSIS — F32.4 MAJOR DEPRESSIVE DISORDER WITH SINGLE EPISODE, IN PARTIAL REMISSION: ICD-10-CM

## 2023-11-06 DIAGNOSIS — R60.0 BILATERAL LEG EDEMA: Primary | ICD-10-CM

## 2023-11-06 DIAGNOSIS — I48.0 PAROXYSMAL ATRIAL FIBRILLATION: ICD-10-CM

## 2023-11-06 DIAGNOSIS — N18.31 STAGE 3A CHRONIC KIDNEY DISEASE: ICD-10-CM

## 2023-11-06 DIAGNOSIS — Z99.89 DEPENDENCE ON OTHER ENABLING MACHINES AND DEVICES: ICD-10-CM

## 2023-11-06 DIAGNOSIS — Z00.00 ENCOUNTER FOR PREVENTIVE HEALTH EXAMINATION: Primary | ICD-10-CM

## 2023-11-06 DIAGNOSIS — Z79.899 ON AMIODARONE THERAPY: ICD-10-CM

## 2023-11-06 PROBLEM — N30.01 ACUTE CYSTITIS WITH HEMATURIA: Status: RESOLVED | Noted: 2023-07-24 | Resolved: 2023-11-06

## 2023-11-06 PROBLEM — R31.0 GROSS HEMATURIA: Status: RESOLVED | Noted: 2023-07-24 | Resolved: 2023-11-06

## 2023-11-06 PROBLEM — G56.02 CARPAL TUNNEL SYNDROME ON LEFT: Status: RESOLVED | Noted: 2019-06-13 | Resolved: 2023-11-06

## 2023-11-06 PROCEDURE — G0439 PR MEDICARE ANNUAL WELLNESS SUBSEQUENT VISIT: ICD-10-PCS | Mod: S$GLB,,, | Performed by: NURSE PRACTITIONER

## 2023-11-06 PROCEDURE — G0439 PPPS, SUBSEQ VISIT: HCPCS | Mod: S$GLB,,, | Performed by: NURSE PRACTITIONER

## 2023-11-06 NOTE — PROGRESS NOTES
"  Corey De La O presented for a  Medicare AWV and comprehensive Health Risk Assessment today. The following components were reviewed and updated:    Medical history  Family History  Social history  Allergies and Current Medications  Health Risk Assessment  Health Maintenance  Care Team         ** See Completed Assessments for Annual Wellness Visit within the encounter summary.**         The following assessments were completed:  Living Situation  CAGE  Depression Screening  Timed Get Up and Go  Whisper Test  Cognitive Function Screening  Nutrition Screening  ADL Screening  PAQ Screening        Vitals:    23 0853   BP: 135/75   Pulse: 70   SpO2: 97%   Weight: (!) 164.7 kg (363 lb)   Height: 5' 11" (1.803 m)     Body mass index is 50.63 kg/m².  Physical Exam  Constitutional:       Appearance: Normal appearance.   HENT:      Head: Normocephalic and atraumatic.      Nose: Nose normal.   Eyes:      Extraocular Movements: Extraocular movements intact.      Pupils: Pupils are equal, round, and reactive to light.   Cardiovascular:      Rate and Rhythm: Normal rate and regular rhythm.      Pulses: Normal pulses.      Heart sounds: Normal heart sounds.   Pulmonary:      Effort: Pulmonary effort is normal.      Breath sounds: Wheezing present.   Musculoskeletal:      Cervical back: Normal range of motion and neck supple.      Right lower le+ Edema present.      Left lower le+ Edema present.   Neurological:      General: No focal deficit present.      Mental Status: He is alert and oriented to person, place, and time.               Diagnoses and health risks identified today and associated recommendations/orders:    1. Encounter for preventive health examination  Awv completed      2. Mixed hyperlipidemia  Chronic and stable. Continue current treatment. Follow with PCP.      3. Paroxysmal atrial fibrillation  Chronic and stable. Continue current treatment. Follow with PCP.  No anticoagulation 2/2 bleeding with " xarelto  To meet to discuss watchman    4. Primary hypertension  Chronic and stable. Continue current treatment. Follow with PCP.  On meds    5. Gastroesophageal reflux disease without esophagitis  Chronic and stable. Continue current treatment. Follow with PCP.  On pepcid    6. BPH associated with nocturia  Chronic and stable. Continue current treatment. Follow with PCP.  On meds - seeing urology      7. Stage 3a chronic kidney disease  Chronic and stable. Continue current treatment. Follow with PCP.      8. Impaired fasting glucose  Chronic and stable. Continue current treatment. Follow with PCP.      9. Major depressive disorder with single episode, in partial remission  Chronic and stable. Continue current treatment. Follow with PCP.  On cymbalta      10. Obstructive sleep apnea syndrome  Chronic and stable. Continue current treatment. Follow with PCP.  Not tolerating cpap machine       11. Dependence on other enabling machines and devices  Chronic and stable. Continue current treatment. Follow with PCP.  Uses walker     12. On amiodarone therapy  Chronic and stable. Continue current treatment. Follow with PCP.  Per cardiology      13. Acute on chronic blood loss anemia  Chronic and stable. Continue current treatment. Follow with PCP.  Monitored with labs - on iron       14. Morbid obesity  Chronic and stable. Continue current treatment. Follow with PCP.  Weight loss and exercise          Provided Corey with a 5-10 year written screening schedule and personal prevention plan. Recommendations were developed using the USPSTF age appropriate recommendations. Education, counseling, and referrals were provided as needed. After Visit Summary printed and given to patient which includes a list of additional screenings\tests needed.    Fu in 1 yr for dung Amaya NP  I offered to discuss advanced care planning, including how to pick a person who would make decisions for you if you were unable to make  them for yourself, called a health care power of , and what kind of decisions you might make such as use of life sustaining treatments such as ventilators and tube feeding when faced with a life limiting illness recorded on a living will that they will need to know. (How you want to be cared for as you near the end of your natural life)     X Patient is interested in learning more about how to make advanced directives.  I provided them paperwork and offered to discuss this with them.

## 2023-11-06 NOTE — TELEPHONE ENCOUNTER
----- Message from Crystal Hendrickson sent at 11/6/2023 11:20 AM CST -----  Contact: self  Type:  Needs Medical Advice    Who Called: self  Symptoms (please be specific): does dr want him to continue taking the Torsemide. He requested a refill but it was denied.   Would the patient rather a call back or a response via [facility]? call  Best Call Back Number: [phone] (home)     Additional Information: please advise and thank you.

## 2023-11-06 NOTE — TELEPHONE ENCOUNTER
Spoke with patient in regards to his torsemide medication. Patient stated the ordering provider denied his Rx request. Patient is wondering if Dr. Cervantes is wanting him to continue the medication. Message sent to Dr. Cervantes.

## 2023-11-06 NOTE — PATIENT INSTRUCTIONS
Counseling and Referral of Other Preventative  (Italic type indicates deductible and co-insurance are waived)    Patient Name: Corey De La O  Today's Date: 11/6/2023    Health Maintenance       Date Due Completion Date    TETANUS VACCINE Never done ---    RSV Vaccine (Age 60+) (1 - 1-dose 60+ series) Never done ---    Pneumococcal Vaccines (Age 65+) (2 - PCV) 09/25/2018 9/25/2017    PROSTATE-SPECIFIC ANTIGEN 05/26/2021 5/26/2020    Influenza Vaccine (1) 09/01/2023 9/9/2018    COVID-19 Vaccine (6 - 2023-24 season) 09/01/2023 12/24/2021    Hemoglobin A1c (Prediabetes) 04/27/2024 4/27/2023    Lipid Panel 04/27/2028 4/27/2023    Colorectal Cancer Screening 10/02/2028 10/2/2018        No orders of the defined types were placed in this encounter.      The following information is provided to all patients.  This information is to help you find resources for any of the problems found today that may be affecting your health:                Living healthy guide: www.[facility].[place].gov      Understanding Diabetes: www.diabetes.org      Eating healthy: www.cdc.gov/healthyweight      CDC home safety checklist: www.cdc.gov/steadi/patient.html      Agency on Aging: www.goea.[place].gov      Alcoholics anonymous (AA): www.aa.org      Physical Activity: www.pebbles.nih.gov/xl4mxog      Tobacco use: www.quitwithusla.org

## 2024-03-14 DIAGNOSIS — I10 ESSENTIAL HYPERTENSION: ICD-10-CM

## 2024-03-14 RX ORDER — LOSARTAN POTASSIUM 50 MG/1
50 TABLET ORAL 2 TIMES DAILY
Qty: 180 TABLET | Refills: 1 | Status: SHIPPED | OUTPATIENT
Start: 2024-03-14

## 2024-03-14 NOTE — TELEPHONE ENCOUNTER
No care due was identified.  [facility] Embedded Care Due Messages. Reference number: [id number].   3/14/2024 8:09:33 AM CDT

## 2024-03-14 NOTE — TELEPHONE ENCOUNTER
Refill Decision Note   Corey De La O  is requesting a refill authorization.  Brief Assessment and Rationale for Refill:  Approve     Medication Therapy Plan: No dose adjustment recommended per renal fxn.      Pharmacist review requested: Yes   Extended chart review required: Yes   Comments:     Note composed:2:38 PM 03/14/2024

## 2024-03-14 NOTE — TELEPHONE ENCOUNTER
Refill Routing Note   Medication(s) are not appropriate for processing by [facility] for the following reason(s):        Required labs abnormal  CREATININE 1.66 (H) 07/27/2023         [facility] action(s):  Defer             Pharmacist review requested: Yes     Appointments  past 12m or future 3m with PCP    Date Provider   Last Visit   9/18/2023 Carlos Cervantes MD   Next Visit   Visit date not found Carlos Cervantes MD   ED visits in past 90 days: 0        Note composed:12:56 PM 03/14/2024

## 2024-05-22 DIAGNOSIS — F32.4 MAJOR DEPRESSIVE DISORDER WITH SINGLE EPISODE, IN PARTIAL REMISSION: ICD-10-CM

## 2024-05-22 NOTE — TELEPHONE ENCOUNTER
Refill Routing Note   Medication(s) are not appropriate for processing by [facility] for the following reason(s):        Required labs abnormal: Duloxetine (Cr)    [facility] action(s):  Defer     Requires labs : Yes             Appointments  past 12m or future 3m with PCP    Date Provider   Last Visit   9/18/2023 Carlos Cervantes MD   Next Visit   Visit date not found Carlos Cervantes MD   ED visits in past 90 days: 0        Note composed:5:27 PM 05/22/2024

## 2024-05-22 NOTE — TELEPHONE ENCOUNTER
Care Due:                  Date            Visit Type   Department     Provider  --------------------------------------------------------------------------------                                ESTABLISHED                              PATIENT -    [facility]  Last Visit: 09-      [facility]      MEDICINE       Carlos Cervantes  Next Visit: None Scheduled  None         None Found                                                            Last  Test          Frequency    Reason                     Performed    Due Date  --------------------------------------------------------------------------------    CMP.........  12 months..  famotidine, losartan,      07- 07-                             torsemide................    [employer] Embedded Care Due Messages. Reference number: [id number].   5/22/2024 12:29:27 PM CDT

## 2024-05-23 RX ORDER — DULOXETIN HYDROCHLORIDE 60 MG/1
60 CAPSULE, DELAYED RELEASE ORAL NIGHTLY
Qty: 90 CAPSULE | Refills: 3 | Status: SHIPPED | OUTPATIENT
Start: 2024-05-23 | End: 2025-05-23

## 2024-06-16 DIAGNOSIS — I48.0 PAROXYSMAL ATRIAL FIBRILLATION: Primary | ICD-10-CM

## 2024-06-17 DIAGNOSIS — I48.0 PAROXYSMAL ATRIAL FIBRILLATION: ICD-10-CM

## 2024-06-17 RX ORDER — METOPROLOL SUCCINATE 50 MG/1
50 TABLET, EXTENDED RELEASE ORAL 2 TIMES DAILY
Qty: 60 TABLET | Refills: 0 | Status: SHIPPED | OUTPATIENT
Start: 2024-06-17 | End: 2024-07-17

## 2024-06-17 NOTE — TELEPHONE ENCOUNTER
Refill Routing Note   Medication(s) are not appropriate for processing by [facility] for the following reason(s):        No active prescription written by provider    [facility] action(s):  Defer               Appointments  past 12m or future 3m with PCP    Date Provider   Last Visit   9/18/2023 Carlos Cervantes MD   Next Visit   Visit date not found Carlos Cervantes MD   ED visits in past 90 days: 0        Note composed:3:59 AM 06/17/2024

## 2024-06-17 NOTE — TELEPHONE ENCOUNTER
No care due was identified.  [facility] Embedded Care Due Messages. Reference number: [id number].   6/16/2024 7:48:00 PM CDT

## 2024-06-18 RX ORDER — METOPROLOL SUCCINATE 50 MG/1
50 TABLET, EXTENDED RELEASE ORAL 2 TIMES DAILY
Qty: 180 TABLET | OUTPATIENT
Start: 2024-06-18

## 2024-06-18 NOTE — TELEPHONE ENCOUNTER
No care due was identified.  [facility] Embedded Care Due Messages. Reference number: [id number].   6/17/2024 9:43:34 PM CDT

## 2024-06-18 NOTE — TELEPHONE ENCOUNTER
Quick DC. Inappropriate Request    Refill Authorization Note   Corey De La O  is requesting a refill authorization.  Brief Assessment and Rationale for Refill:  Quick Discontinue  Medication Therapy Plan:       Medication Reconciliation Completed:  No      Comments:     Note composed:3:42 AM 06/18/2024

## 2024-08-08 DIAGNOSIS — I48.0 PAROXYSMAL ATRIAL FIBRILLATION: ICD-10-CM

## 2024-08-08 NOTE — TELEPHONE ENCOUNTER
Received medication refill request. He has not been seen since 09/23 and was supposed to be seen 3 months after that visit. Can you call the patient to schedule a follow up visit and ask Dr. Cervantes if he is okay filing the Metoprolol.

## 2024-08-08 NOTE — TELEPHONE ENCOUNTER
Care Due:                  Date            Visit Type   Department     Provider  --------------------------------------------------------------------------------                                ESTABLISHED                              PATIENT -    [facility] FAMILY  Last Visit: 09-      VIRTUAL      MEDICINE       Carlos Cervantes                              EP -                              PRIMARY      [facility]  Next Visit: 08-      CARE ([facility])   MEDICINE       Carlos Cervantes                                                            Last  Test          Frequency    Reason                     Performed    Due Date  --------------------------------------------------------------------------------    CMP.........  12 months..  DULoxetine, famotidine,    07- 07-                             losartan, torsemide......    [employer] Embedded Care Due Messages. Reference number: [id number].   8/08/2024 2:08:53 PM CDT

## 2024-08-11 RX ORDER — METOPROLOL SUCCINATE 50 MG/1
50 TABLET, EXTENDED RELEASE ORAL 2 TIMES DAILY
Qty: 60 TABLET | Refills: 0 | Status: SHIPPED | OUTPATIENT
Start: 2024-08-11 | End: 2024-09-10

## 2024-09-11 DIAGNOSIS — K21.9 GASTROESOPHAGEAL REFLUX DISEASE WITHOUT ESOPHAGITIS: Primary | ICD-10-CM

## 2024-09-11 NOTE — TELEPHONE ENCOUNTER
No care due was identified.  [facility] Embedded Care Due Messages. Reference number: [id number].   9/11/2024 11:52:03 AM CDT

## 2024-09-11 NOTE — TELEPHONE ENCOUNTER
Refill Routing Note   Medication(s) are not appropriate for processing by [facility] for the following reason(s):        Required labs outdated    [facility] action(s):  Defer               Appointments  past 12m or future 3m with PCP    Date Provider   Last Visit   9/18/2023 Carlos Cervantes MD   Next Visit   10/17/2024 Carlos Cervantes MD   ED visits in past 90 days: 0        Note composed:12:10 PM 09/11/2024

## 2024-09-12 RX ORDER — FAMOTIDINE 20 MG/1
20 TABLET, FILM COATED ORAL DAILY
Qty: 90 TABLET | Refills: 3 | Status: SHIPPED | OUTPATIENT
Start: 2024-09-12 | End: 2025-09-12

## 2024-09-15 DIAGNOSIS — I48.0 PAROXYSMAL ATRIAL FIBRILLATION: ICD-10-CM

## 2024-09-15 NOTE — TELEPHONE ENCOUNTER
No care due was identified.  [facility] Embedded Care Due Messages. Reference number: [id number].   9/15/2024 1:26:58 PM CDT

## 2024-09-16 RX ORDER — METOPROLOL SUCCINATE 50 MG/1
50 TABLET, EXTENDED RELEASE ORAL 2 TIMES DAILY
Qty: 180 TABLET | Refills: 3 | Status: SHIPPED | OUTPATIENT
Start: 2024-09-16 | End: 2025-09-16

## 2024-09-16 NOTE — TELEPHONE ENCOUNTER
Refill Routing Note   Medication(s) are not appropriate for processing by [facility] for the following reason(s):        No active prescription written by provider    [facility] action(s):  Defer        Medication Therapy Plan: ENDED  09/10/24; DEFER TO YOU FOR REVIEW      Appointments  past 12m or future 3m with PCP    Date Provider   Last Visit   Visit date not found Carlos Cervantes MD   Next Visit   10/17/2024 Carlos Cervantes MD   ED visits in past 90 days: 0        Note composed:7:09 AM 09/16/2024

## 2024-10-05 DIAGNOSIS — R35.1 BPH ASSOCIATED WITH NOCTURIA: ICD-10-CM

## 2024-10-05 DIAGNOSIS — N40.1 BPH ASSOCIATED WITH NOCTURIA: ICD-10-CM

## 2024-10-05 NOTE — TELEPHONE ENCOUNTER
No care due was identified.  [facility] Embedded Care Due Messages. Reference number: [id number].   10/05/2024 1:12:34 PM CDT

## 2024-10-06 RX ORDER — FINASTERIDE 5 MG/1
5 TABLET, FILM COATED ORAL DAILY
Qty: 90 TABLET | Refills: 0 | Status: SHIPPED | OUTPATIENT
Start: 2024-10-06

## 2024-10-06 RX ORDER — TAMSULOSIN HYDROCHLORIDE 0.4 MG/1
0.4 CAPSULE ORAL NIGHTLY
Qty: 90 CAPSULE | Refills: 0 | Status: SHIPPED | OUTPATIENT
Start: 2024-10-06

## 2024-10-06 NOTE — TELEPHONE ENCOUNTER
Refill Decision Note   Corey De La O  is requesting a refill authorization.  Brief Assessment and Rationale for Refill:  Approve     Medication Therapy Plan:         Comments:     Note composed:11:41 AM 10/06/2024

## 2024-11-11 PROBLEM — I34.0 MITRAL REGURGITATION: Status: ACTIVE | Noted: 2024-11-11

## 2024-11-20 DIAGNOSIS — N18.31 STAGE 3A CHRONIC KIDNEY DISEASE: ICD-10-CM

## 2024-12-04 DIAGNOSIS — R73.03 PREDIABETES: ICD-10-CM

## 2025-01-18 DIAGNOSIS — N40.1 BPH ASSOCIATED WITH NOCTURIA: ICD-10-CM

## 2025-01-18 DIAGNOSIS — R35.1 BPH ASSOCIATED WITH NOCTURIA: ICD-10-CM

## 2025-01-18 NOTE — TELEPHONE ENCOUNTER
Care Due:                  Date            Visit Type   Department     Provider  --------------------------------------------------------------------------------                                ESTABLISHED                              PATIENT -    [facility] FAMILY  Last Visit: 09-      VIRTUAL      MEDICINE       Carlos Cervantes                              EP -                              PRIMARY      [facility]  Next Visit: 02-      CARE ([facility])   MEDICINE       Carlos Cervantes                                                            Last  Test          Frequency    Reason                     Performed    Due Date  --------------------------------------------------------------------------------    CMP.........  12 months..  DULoxetine, famotidine,    07- 07-                             losartan, torsemide......    [employer] Embedded Care Due Messages. Reference number: [id number].   1/18/2025 9:39:14 AM CST   Certification of Assistant at Surgery       Surgery Date: 8/13/2024     Participating Surgeons:  Surgeons and Role:     * Kai Washington MD - Primary    Procedures:  Procedure(s) (LRB):  ARTHROSCOPY,KNEE,WITH MENISCUS REPAIR (Left)  ARTHROSCOPY, KNEE, WITH CHONDROPLASTY (Left)  ARTHROSCOPY, KNEE, WITH  MICROFRACTURE (Left)    Assistant Surgeon's Certification of Necessity:  I understand that section 1842 (b) (6) (d) of the Social Security Act generally prohibits Medicare Part B reasonable charge payment for the services of assistants at surgery in teaching hospitals when qualified residents are available to furnish such services. I certify that the services for which payment is claimed were medically necessary, and that no qualified resident was available to perform the services. I further understand that these services are subject to post-payment review by the Medicare carrier.      Warren Alegre MD    08/13/2024  7:53 AM

## 2025-01-19 RX ORDER — FINASTERIDE 5 MG/1
5 TABLET, FILM COATED ORAL DAILY
Qty: 90 TABLET | Refills: 0 | Status: SHIPPED | OUTPATIENT
Start: 2025-01-19

## 2025-01-19 RX ORDER — TAMSULOSIN HYDROCHLORIDE 0.4 MG/1
0.4 CAPSULE ORAL DAILY
Qty: 90 CAPSULE | Refills: 0 | Status: SHIPPED | OUTPATIENT
Start: 2025-01-19

## 2025-02-04 NOTE — TELEPHONE ENCOUNTER
Callback to patient--Dr Monreal needs clearance carpal tunnel release left hand--fax number given to have Dr Pack to fax to us   stble  Orders:  •  Eliquis 5 MG; Take 1 tablet (5 mg total) by mouth 2 (two) times a day

## 2025-02-22 DIAGNOSIS — Z00.00 ENCOUNTER FOR MEDICARE ANNUAL WELLNESS EXAM: ICD-10-CM

## 2025-04-04 ENCOUNTER — TELEPHONE (OUTPATIENT)
Dept: FAMILY MEDICINE | Facility: CLINIC | Age: 75
End: 2025-04-04
Payer: MEDICARE

## 2025-04-04 NOTE — TELEPHONE ENCOUNTER
----- Message from Shannan sent at 4/4/2025 10:52 AM CDT -----  Name of Who is Calling:NATALIA PUENTE [[id number]]  What is the request in detail:Pt requesting a call back today if possible.  Can the clinic reply by [email or web address]:Call  What Number to Call Back if not in [facility]:Telephone Information:[place]          [phone]

## 2025-04-04 NOTE — TELEPHONE ENCOUNTER
Patient is needing to schedule an appointment and labs soon. Pt states he is having a hard time leaving his home now. He is wanting a home health referral and is wanting someone to come draw his blood for his labs as it is difficult for him to leave his home.

## 2025-04-10 ENCOUNTER — OFFICE VISIT (OUTPATIENT)
Dept: FAMILY MEDICINE | Facility: CLINIC | Age: 75
End: 2025-04-10
Payer: MEDICARE

## 2025-04-10 DIAGNOSIS — N18.31 STAGE 3A CHRONIC KIDNEY DISEASE: ICD-10-CM

## 2025-04-10 DIAGNOSIS — R35.1 BPH ASSOCIATED WITH NOCTURIA: ICD-10-CM

## 2025-04-10 DIAGNOSIS — J02.0 ACUTE STREPTOCOCCAL PHARYNGITIS: ICD-10-CM

## 2025-04-10 DIAGNOSIS — R26.2 DIFFICULTY WALKING: ICD-10-CM

## 2025-04-10 DIAGNOSIS — Z79.899 ON AMIODARONE THERAPY: ICD-10-CM

## 2025-04-10 DIAGNOSIS — D62 ACUTE ON CHRONIC BLOOD LOSS ANEMIA: ICD-10-CM

## 2025-04-10 DIAGNOSIS — I48.0 PAROXYSMAL ATRIAL FIBRILLATION: ICD-10-CM

## 2025-04-10 DIAGNOSIS — F32.4 MAJOR DEPRESSIVE DISORDER WITH SINGLE EPISODE, IN PARTIAL REMISSION: ICD-10-CM

## 2025-04-10 DIAGNOSIS — E66.01 MORBID OBESITY: ICD-10-CM

## 2025-04-10 DIAGNOSIS — I10 PRIMARY HYPERTENSION: ICD-10-CM

## 2025-04-10 DIAGNOSIS — K21.9 GASTROESOPHAGEAL REFLUX DISEASE WITHOUT ESOPHAGITIS: Primary | ICD-10-CM

## 2025-04-10 DIAGNOSIS — E78.2 MIXED HYPERLIPIDEMIA: ICD-10-CM

## 2025-04-10 DIAGNOSIS — N40.1 BPH ASSOCIATED WITH NOCTURIA: ICD-10-CM

## 2025-04-10 DIAGNOSIS — Z12.5 PROSTATE CANCER SCREENING: ICD-10-CM

## 2025-04-10 DIAGNOSIS — R73.01 IMPAIRED FASTING GLUCOSE: ICD-10-CM

## 2025-04-10 DIAGNOSIS — R60.0 BILATERAL LEG EDEMA: ICD-10-CM

## 2025-04-10 PROCEDURE — 98005 SYNCH AUDIO-VIDEO EST LOW 20: CPT | Mod: 95,,, | Performed by: INTERNAL MEDICINE

## 2025-04-10 PROCEDURE — G2211 COMPLEX E/M VISIT ADD ON: HCPCS | Mod: 95,,, | Performed by: INTERNAL MEDICINE

## 2025-04-10 RX ORDER — DULOXETIN HYDROCHLORIDE 60 MG/1
60 CAPSULE, DELAYED RELEASE ORAL NIGHTLY
Qty: 90 CAPSULE | Refills: 3 | Status: SHIPPED | OUTPATIENT
Start: 2025-04-10 | End: 2026-04-10

## 2025-04-10 RX ORDER — TAMSULOSIN HYDROCHLORIDE 0.4 MG/1
0.4 CAPSULE ORAL DAILY
Qty: 90 CAPSULE | Refills: 0 | Status: SHIPPED | OUTPATIENT
Start: 2025-04-10

## 2025-04-10 RX ORDER — LOSARTAN POTASSIUM 50 MG/1
50 TABLET ORAL 2 TIMES DAILY
Qty: 180 TABLET | Refills: 1 | Status: SHIPPED | OUTPATIENT
Start: 2025-04-10

## 2025-04-10 RX ORDER — AMIODARONE HYDROCHLORIDE 200 MG/1
200 TABLET ORAL DAILY
Qty: 90 TABLET | Refills: 3 | Status: SHIPPED | OUTPATIENT
Start: 2025-04-10

## 2025-04-10 RX ORDER — AZITHROMYCIN 250 MG/1
TABLET, FILM COATED ORAL
Qty: 6 TABLET | Refills: 0 | Status: SHIPPED | OUTPATIENT
Start: 2025-04-10 | End: 2025-04-15

## 2025-04-10 NOTE — PROGRESS NOTES
[facility]  Primary [facility]   [address] [facility] [address]       Patient ID: Corey De La O     Chief Complaint:  Routine follow-up for labs    The patient location is:  [place]  The chief complaint leading to consultation is:  Routine follow-up for labs    Visit type: audiovisual    Face to Face time with patient:  18 minutes  Twenty-three minutes of total time spent on the encounter, which includes face to face time and non-face to face time preparing to see the patient (eg, review of tests), Obtaining and/or reviewing separately obtained history, Documenting clinical information in the electronic or other health record, Independently interpreting results (not separately reported) and communicating results to the patient/family/caregiver, or Care coordination (not separately reported).         Each patient to whom he or she provides medical services by telemedicine is:  (1) informed of the relationship between the physician and patient and the respective role of any other health care provider with respect to management of the patient; and (2) notified that he or she may decline to receive medical services by telemedicine and may withdraw from such care at any time.    Notes:         HPI:  Routine follow-up for labs, but it has been quite a few years since I last saw him.  It seems that ever since the COVID pandemic he has been unwilling to venture outside of his home so our contact has been limited.  He last presented to [facility] in July of 2023 due to high heart rate from AFib as well as shortness a breath.  Labs there showed a new anemia and he was transfused a few units of packed red blood cells and sent home.  He has not had any follow-up since then.  He is due for refills of his blood pressure medications and amiodarone.  His Xarelto was stopped in 2023 because he was bleeding and never restarted.  I can only assume that he needs some kind of blood thinning and he actually has an  appointment with Cardiology you in a few days so I want him to reach out to them to see if that can be done via a virtual visit.  I will [facility] since he is still homebound.  I also need labs so I will order [facility] to get labs and also provide some physical and occupational therapy because he admits that he has mobility impairments.  He is most likely morbidly obese again and that has impairing his ability to move and ambulate.  He is due for a lot of health maintenance which we will get 2 in due time but for now I will order a large amount of labs and we will have another virtual visit in one-month.  He does complain of a cough has been present for many months.  He is currently on doxycycline for a cellulitis in his pannus and that was prescribed by his family member that has a nurse practitioner.  I will add a Z-Teodoro in case this cough represents a smoldering sinus infection.    Review of Systems       Difficulty walking    Objective:      Physical Exam   Physical Exam     Virtual Visit        Vitals: There were no vitals filed for this visit.     Assessment:           Plan:       Corey De La O  was seen today for follow-up and may need lab work.    Diagnoses and all orders for this visit:    Diagnoses and all orders for this visit:    Gastroesophageal reflux disease without esophagitis  Controlled with Famotidine     Paroxysmal atrial fibrillation  -     amiodarone (PACERONE) 200 MG Tab; Take 1 tablet (200 mg total) by mouth once daily.  -     Ambulatory referral/consult to [facility]; Future  -     Ambulatory referral/consult to Home Health; Future  I assume that he is controlled with amiodarone alone.  He will see Cardiology to get evaluated for anticoagulation.    Major depressive disorder with single episode, in partial remission  -     DULoxetine (CYMBALTA) 60 MG capsule; Take 1 capsule (60 mg total) by mouth every evening. TAKE 1 CAPSULE(60 MG) BY  MOUTH EVERY EVENING  Seems stable with Cymbalta    BPH associated with nocturia  -     tamsulosin (FLOMAX) 0.4 mg Cap; Take 1 capsule (0.4 mg total) by mouth once daily.  Controlled with Flomax    Bilateral leg edema  -     torsemide 40 mg Tab; Take 40 mg by mouth once daily.  Continue torsemide for now and I will have to rely on the [facility] to let me know if it is controlled    Acute on chronic blood loss anemia  -     Cancel: CBC Auto Differential; Future  -     Cancel: Iron and TIBC; Future  -     Cancel: Ferritin; Future  -     CBC Auto Differential; Future  -     Ferritin; Future  -     Iron and TIBC; Future  We will recheck labs to gauge the presence and degree of his anemia    Prostate cancer screening  -     Cancel: PSA, Screening; Future  -     PSA, Screening; Future  Monitor PSA    On amiodarone therapy  -     Cancel: TSH; Future  -     TSH; Future  Monitor TSH    Mixed hyperlipidemia  -     Cancel: Comprehensive Metabolic Panel; Future  -     Comprehensive Metabolic Panel; Future  Monitor lipids and CMP    Impaired fasting glucose  -     Cancel: Hemoglobin A1C; Future  -     Hemoglobin A1C; Future    Primary hypertension  -     losartan (COZAAR) 50 MG tablet; Take 1 tablet (50 mg total) by mouth 2 (two) times daily.  -     Cancel: Lipid Panel; Future  -     Lipid Panel; Future  We will have to monitor blood pressure through home health in the [facility] a continue losartan for now    Morbid obesity  We will need an updated weight    Acute streptococcal pharyngitis  -     azithromycin (Z-ELIE) 250 MG tablet; Take 2 tablets by mouth on day 1; Take 1 tablet by mouth on days 2-5    Stage 3a chronic kidney disease  Needs updated labs    Difficulty walking       Visit today included increased complexity associated with the care of the episodic problem hypertension hyperlipidemia atrial fibrillation impaired fasting glucose anemia addressed and managing the longitudinal care of the  patient due to the serious and/or complex managed problem(s) .        Carlos Cervantes MD

## 2025-04-11 ENCOUNTER — TELEPHONE (OUTPATIENT)
Dept: HOME HEALTH SERVICES | Facility: CLINIC | Age: 75
End: 2025-04-11
Payer: MEDICARE

## 2025-04-11 PROCEDURE — G0180 MD CERTIFICATION HHA PATIENT: HCPCS | Mod: ,,, | Performed by: INTERNAL MEDICINE

## 2025-04-11 NOTE — TELEPHONE ENCOUNTER
Contacted pt to schedule an appointment regarding a referral placed for a medical home visit with a nurse practitioner.    Patient has been scheduled

## 2025-04-16 ENCOUNTER — CARE AT HOME (OUTPATIENT)
Dept: HOME HEALTH SERVICES | Facility: CLINIC | Age: 75
End: 2025-04-16
Payer: MEDICARE

## 2025-04-16 VITALS — HEART RATE: 73 BPM | DIASTOLIC BLOOD PRESSURE: 78 MMHG | OXYGEN SATURATION: 96 % | SYSTOLIC BLOOD PRESSURE: 120 MMHG

## 2025-04-16 DIAGNOSIS — I48.0 PAROXYSMAL ATRIAL FIBRILLATION: ICD-10-CM

## 2025-04-16 DIAGNOSIS — M15.0 PRIMARY GENERALIZED (OSTEO)ARTHRITIS: ICD-10-CM

## 2025-04-16 DIAGNOSIS — R60.0 BILATERAL LOWER EXTREMITY EDEMA: Primary | ICD-10-CM

## 2025-04-16 DIAGNOSIS — I10 PRIMARY HYPERTENSION: ICD-10-CM

## 2025-04-16 DIAGNOSIS — Z74.09 IMPAIRED MOBILITY AND ACTIVITIES OF DAILY LIVING: ICD-10-CM

## 2025-04-16 DIAGNOSIS — Z78.9 IMPAIRED MOBILITY AND ACTIVITIES OF DAILY LIVING: ICD-10-CM

## 2025-04-16 PROCEDURE — 99350 HOME/RES VST EST HIGH MDM 60: CPT | Mod: S$GLB,,,

## 2025-04-16 NOTE — PROGRESS NOTES
" Subjective:    Patient ID:  Corey De La O is a 74 y.o. male patient here for evaluation No chief complaint on file.    History of Present Illness:     Corey De La O is a 74 y.o. male who follows with Dr. Fallon here today for medication refill. Last seen in clinic 2023. He denies reports progressive SOB/HERRERA, fatigue, activity intolerance over last 3-4 weeks. He denies CP, palpitations, dizziness. He states that he has been "unable to leave his house since COVID pandemic." Only able to walk to [facility] and back but even that wears him out.     Of note, he has history of paroxysmal AF and is not on OAC.  He was on Xarelto, but it was stopped shortly after due to gross hematuria.  Cystoscope at the time was negative for acute findings. He was instructed to follow up for Watchman device evaluation but never did.  He has been off of OAC since. He has [employer], which he checks essentially daily and has seen atrial fibrillation alerts every few days.     He was off amiodarone for last 3 months due to refill issues. Started earlier this month.      He does not check his BP at home.     Focused Active Problem List includes:  Paroxysmal AF   ILIANA/DCCV x2 in 2023  Not on OAC - see HPI  Hypertension   Hyperlipidemia   CKD 3A   Obesity   Sleep apnea  Sedentary lifestyle      Most Recent Echocardiogram Results  Results for orders placed during the hospital encounter of 06/02/23    Transesophageal echo (ILIANA) with possible cardioversion    Interpretation Summary  · Normal left ventricular diastolic function.  · Low normal systolic function.  · Normal appearing left atrial appendage. No thrombus is present in the appendage. Normal appendage velocities.  · A 200 J synchronized cardioversion was successfully performed with restoration of normal sinus rhythm.  · Study truncated secondary to hypoxemia      Most Recent Nuclear Stress Test Results  No results found for this or any previous visit.      Most Recent Cardiac PET " Stress Test Results  No results found for this or any previous visit.      Most Recent Cardiovascular Angiogram results  No results found for this or any previous visit.      Other Most Recent Cardiology Results  Results for orders placed during the hospital encounter of 07/22/23    CARDIAC MONITORING STRIPS      REVIEW OF SYSTEMS: As noted in HPI   CARDIOVASCULAR: See HPI  RESPIRATORY: See HPI  : No blood in the urine  GI: No reflux, nausea, vomiting, or blood in stool.   MUSCULOSKELETAL: No falls.   NEURO: No headaches, syncope, or dizziness.  EYES: No sudden changes in vision.     Past Medical History:   Diagnosis Date    Anxiety     Atrial fibrillation with RVR 4/26/2023    BPH associated with nocturia     Carpal tunnel syndrome on both sides     Depression     DJD (degenerative joint disease) of cervical spine     Hypertension     Paroxysmal atrial fibrillation with RVR 05/2023    Pre-diabetes 2013    Sleep apnea     usesCPAP     Past Surgical History:   Procedure Laterality Date    BACK SURGERY      benign tumor from sacrum, laminectomy    CARPAL TUNNEL RELEASE Left 06/13/2019    Procedure: RELEASE, CARPAL TUNNEL;  Surgeon: ALEXA Pack MD;  Location: [facility];  Service: Orthopedics;  Laterality: Left;    CATARACT EXTRACTION, BILATERAL      COLONOSCOPY N/A 10/02/2018    Procedure: COLONOSCOPY;  Surgeon: Zaki Staples Jr., MD;  Location: [facility] END[facility];  Service: Endoscopy;  Laterality: N/A;    CYSTOSCOPY W/ RETROGRADES N/A 7/26/2023    Procedure: CYSTOSCOPY,  Urethral Dilation;  Surgeon: Jesus Toro MD;  Location: [facility] OR;  Service: Urology;  Laterality: N/A;    EAR CANALOPLASTY      childhood    EYE SURGERY      gastric sleeve  2014    TONSILLECTOMY      and adenoidectomy    TRANSESOPHAGEAL ECHOCARDIOGRAM WITH POSSIBLE CARDIOVERSION (ILIANA W/ POSS CARDIOVERSION) N/A 05/02/2023    Procedure: TRANSESOPHAGEAL ECHOCARDIOGRAM WITH POSSIBLE CARDIOVERSION (ILIANA W/ POSS CARDIOVERSION);  Surgeon: Hong MCFARLAND  MD Leeanne;  Location: [facility];  Service: Cardiology;  Laterality: N/A;    TRANSESOPHAGEAL ECHOCARDIOGRAM WITH POSSIBLE CARDIOVERSION (ILIANA W/ POSS CARDIOVERSION) N/A 6/2/2023    Procedure: TRANSESOPHAGEAL ECHOCARDIOGRAM WITH POSSIBLE CARDIOVERSION (ILIANA W/ POSS CARDIOVERSION);  Surgeon: Hong Fallon MD;  Location: [facility];  Service: Cardiology;  Laterality: N/A;     Social History[1]      Objective    No vital signs obtained (virtual visit)    The 10-year ASCVD risk score (Christos DK, et al., 2019) is: 25.6%    Values used to calculate the score:      Age: 74 years      Sex: Male      Is Non- : No      Diabetic: No      Tobacco smoker: No      Systolic Blood Pressure: 120 mmHg      Is BP treated: Yes      HDL Cholesterol: 33 mg/dL      Total Cholesterol: 186 mg/dL      LAST EKG  Results for orders placed or performed during the hospital encounter of 07/22/23   EKG 12-lead    Collection Time: 07/22/23  3:01 PM    Narrative    Test Reason : R06.02,    Vent. Rate : 094 BPM     Atrial Rate : 094 BPM     P-R Int : 218 ms          QRS Dur : 100 ms      QT Int : 358 ms       P-R-T Axes : 065 -65 079 degrees     QTc Int : 447 ms    Sinus rhythm with 1st degree A-V block with Premature atrial complexes  Left axis deviation  Anterolateral infarct (cited on or before 26-APR-2023)  Abnormal ECG  When compared with ECG of 02-JUN-2023 11:32,  Premature atrial complexes are now Present  Criteria for Inferior infarct are no longer Present  Nonspecific T wave abnormality no longer evident in Inferior leads  Confirmed by Emi DAVISON MD, Misael KIMBLE ([id number]) on 7/25/2023 5:46:29 AM    Referred By: [email or web address]   SELF           Confirmed By:Misael Middleton III, MD     LIPIDS - LAST 2   Lab Results   Component Value Date    CHOL 186 04/27/2023    CHOL 176 05/26/2020    HDL 33 (L) 04/27/2023    HDL 48 05/26/2020    LDLCALC 120.6 04/27/2023    LDLCALC 105.0 05/26/2020    TRIG 162 (H) 04/27/2023    TRIG 115  05/26/2020    CHOLHDL 17.7 (L) 04/27/2023    CHOLHDL 27.3 05/26/2020     CARDIAC PROFILE - LAST 2  Lab Results   Component Value Date    TROPONINI <0.012 07/22/2023    TROPONINI 0.020 04/26/2023      CBC - LAST 2  Lab Results   Component Value Date    WBC 12.59 07/27/2023    WBC 9.21 07/26/2023    HGB 7.2 (L) 07/27/2023    HGB 7.3 (L) 07/26/2023    HCT 23.6 (L) 07/27/2023    HCT 24.3 (L) 07/26/2023     (H) 07/27/2023     (H) 07/26/2023     Lab Results   Component Value Date    LABPT 14.5 07/25/2023    LABPT 13.8 04/26/2023    INR 1.1 07/25/2023    INR 1.1 04/26/2023    APTT 31.5 04/26/2023     CHEMISTRY - LAST 2  Lab Results   Component Value Date     07/27/2023     07/26/2023    K 4.1 07/27/2023    K 3.6 07/26/2023    CO2 29 07/27/2023    CO2 27 07/26/2023    BUN 30 (H) 07/27/2023    BUN 29 (H) 07/26/2023    CREATININE 1.66 (H) 07/27/2023    CREATININE 1.53 (H) 07/26/2023     (H) 07/27/2023     07/26/2023    CALCIUM 8.1 (L) 07/27/2023    CALCIUM 8.0 (L) 07/26/2023    MG 2.1 07/22/2023    MG 2.0 05/03/2023    ALBUMIN 3.1 (L) 07/27/2023    ALBUMIN 3.3 (L) 07/25/2023    ALT 16 07/27/2023    ALT 17 07/25/2023    AST 20 07/27/2023    AST 24 07/25/2023      ENDOCRINE - LAST 2  Lab Results   Component Value Date    HGBA1C 6.5 (H) 04/27/2023    HGBA1C 5.8 (H) 05/26/2020    TSH 2.130 04/27/2023    TSH 1.664 09/17/2019        PHYSICAL EXAM: N/A (virtual)    I HAVE REVIEWED :    The vital signs, most recent cardiac testing, and most recent pertinent non-cardiology provider notes.    Current Outpatient Medications   Medication Instructions    amiodarone (PACERONE) 200 mg, Oral, Daily    b complex vitamins tablet 1 tablet, Oral, Every Mon, Wed, Fri    bismuth subsalicylate (BISMAREX) 524 mg, Oral, Daily PRN    DULoxetine (CYMBALTA) 60 mg, Oral, Nightly, TAKE 1 CAPSULE(60 MG) BY MOUTH EVERY EVENING    famotidine (PEPCID) 20 mg, Oral, Daily    ferrous sulfate (IRON (FERROUS SULFATE)) 325  mg, Oral, With breakfast, To build blood levels back up    finasteride (PROSCAR) 5 mg, Oral    losartan (COZAAR) 50 mg, Oral, 2 times daily    metoprolol succinate (TOPROL-XL) 50 mg, Oral, 2 times daily    multivitamin (THERAGRAN) per tablet 1 tablet, Oral, Daily    nystatin (MYCOSTATIN) powder Topical (Top), 4 times daily    soft lens adjunctive solutions (SALINE OPHT) 1 drop, Both Eyes, 3 times daily    tamsulosin (FLOMAX) 0.4 mg, Oral, Daily    torsemide 40 mg, Oral, Daily        Assessment & Plan   Progressive SOB/HERRERA  -Echo  -Nuclear stress test    Paroxysmal AF  Continue amiodarone 200 mg daily  Continue metoprolol 50 mg b.i.d.  Not on OAC  -Obtain clearance from Urology to resume OAC    -If yes, start apixaban 5 mg BID   -If no, expedite Watchman evaluation    Hypertension  Stable  Continue losartan 50 mg daily    BLE edema  Continue torsemide 40 mg daily     Mixed hyperlipidemia  Consider adding statin depending on upcoming lipid panel    CKD 3a  ANURAG  Morbid obesity            I emphasized the importance of modifying lifestyle related risk factors including tobacco avoidance, limiting alcohol intake, aerobic exercise, weight management and Mediterranean diet.      Follow up as scheduled.     Corey Darvill, NP [facility]   Office: [phone]       [1]   Social History  Tobacco Use    Smoking status: Never    Smokeless tobacco: Never   Substance Use Topics    Alcohol use: No    Drug use: No

## 2025-04-16 NOTE — PROGRESS NOTES
[facility]  Medical Chronic Care Home Visit    Encounter Provider: FELIX OLIVER,   PCP: Carlos Cervantes MD  Consult Requested By: Dr. Carlos Cervantes    HISTORY OF PRESENT ILLNESS      Patient ID: Corey De La O is a 74 y.o. male is being seen in the home due to physical debility that presents a taxing effort to leave the home, to mitigate high risk of hospital readmission and/or due to the limited availability of reliable or safe options for transportation to the point of access to the provider. Prior to treatment on this visit the chart was reviewed and patient verbal consent was obtained.      Chronic medical conditions synopsis:    Mr. De La O is a 74 y.o. male who has a past medical history significant for morbid obesity ,depression,  pAF, HTN, HLD, BPH, 3aCKD, anemia, OA, ANURAG and impaired mobility r/t obesity      Reason for consult and visit   Establish with [facility] for chronic care medical management within the home.       Recent developments  Mr. De La O is a 74 year old male who is homebound due to morbid obesity. He had a recent virtual visit with PCP after years without follow up.     Primary concerns today are:  Paroxymal afib without anticoagulation. Previously on xarelto. States not on blood thinners because it made him severely anemic. He is currently on iron medication. Hospitalization 07/23 - symptomatic hypotension, hemoglobin 7.1. No source of GI bleeding found. Hematuria noted intermittent for the previous month, cystoscopy performed, PSAE in urine, obstructive BPH, discharged home with cameron and , and xarelto held. Instructed to follow up with PCP/cardiology/urology for decision on resuming. Has not had in person visit since this admission.     He is able to walk very short distances with walker however he feels this has caused him to go into a. Fib in the past. He is on amiodarone and metoprolol for a fib control. Upcoming appointment with cardiology schedules to  discuss blood thinner need as well as BLE.     He has pitting 2+ swelling to BLE which he states is his norm. There is no redness or discoloration to BLE. His persistent edema is likely exacerbated by primarily dependant position and lack of mobility. He is compliant with torsemide 40mg. Discussed will need to get labs (via )  and discuss with cardiology a possible increase/change in diuretics.     Recently treated for abdominal fold cellulitis as well as ceftin for cough/URI. Finishing antibiotics today. Cough and skin infection improved.     VSS. Compliant with current medications. Utilizes diclofenac PRN for joint pain. [facility] just started with [facility] - PT, OT, nurse services.       ADL functionality -   lives alone in apartment  Mobility- impaired due to obesity. Can walk only very shorts distances with walker.   Appetite is hearty. He relies on delivery of food and groceries.   Elimination - reports usually every other day BM. Sometimes bounces between constipation and diarrhea. Discussed miralax in length. Avoid stimulant laxatives.     Sleep- adequate, nocturia but able to go right back to sleep.   Medication Management - per self.. Medication needs at this time - none.  Transportation to and from appointments via does not drive at this time. No transportation.     Discussed care at home visits as supplementation to PCP. Will follow up PRN. Care at home information provided and left inside home.     DECISION MAKING TODAY       Assessment & Plan: SEE HPI    1. Bilateral lower extremity edema    2. Paroxysmal atrial fibrillation  Overview:  ILIANA cardioversion 05/02/2023  Repeat cardioversion 06/02/2023 after load of amiodarone    Orders:  -     [facility]    3. Impaired mobility and activities of daily living    4. Primary generalized (osteo)arthritis    5. Primary hypertension           ENVIRONMENT OF CARE      Family and/or Caregiver present at visit?   No  Name of Caregiver: n/a  Does Caregiver have HCPoA: n/a  History provided by: patient    Impression upon entering the home:  Physical Dwelling: apartment/condo   Appearance of home environment: cleaniness: dirty, walking pathways: clear, lighting: adequate, and home structure: sound structure  Functional Status: moderate assistance  Mobility: ambulatory with device; minimal mobility  Nutritional access: adequate intake and access  Home Health: Yes, [facility]    DME/Supplies: rolling walker         Disease/illness education:  A. Fib, BLE, obesity  Establishment or re-establishment of referral orders for community resources: No other necessary community resources.   Discussion with other health care providers: No discussion with other health care providers necessary.   Does patient have a PCP at [place]? Yes   Repatriation plan with PCP? follow-up with PCP within 30d   Does patient have an ostomy (ileostomy, colostomy, suprapubic catheter, nephrostomy tube, tracheostomy, PEG tube, pleurex catheter, cholecystostomy, etc)? No  Were BPAs reviewed? Yes      Social History     Socioeconomic History    Marital status: Single   Tobacco Use    Smoking status: Never    Smokeless tobacco: Never   Substance and Sexual Activity    Alcohol use: No    Drug use: No     Social Drivers of Health     Financial Resource Strain: Low Risk  (4/10/2025)    Overall Financial Resource Strain (CARDIA)     Difficulty of Paying Living Expenses: Not hard at all   Food Insecurity: No Food Insecurity (4/10/2025)    Hunger Vital Sign     Worried About Running Out of Food in the Last Year: Never true     Ran Out of Food in the Last Year: Never true   Transportation Needs: Unmet Transportation Needs (4/10/2025)    PRAPARE - Transportation     Lack of Transportation (Medical): Yes     Lack of Transportation (Non-Medical): No   Physical Activity: Inactive (4/10/2025)    Exercise Vital Sign     Days of Exercise per Week: 0 days     Minutes of Exercise  per Session: 0 min   Stress: No Stress Concern Present (4/10/2025)    [employer] - Occupational Stress Questionnaire     Feeling of Stress : Only a little   Housing Stability: Low Risk  (4/10/2025)    Housing Stability Vital Sign     Unable to Pay for Housing in the Last Year: No     Number of Times Moved in the Last Year: 0     Homeless in the Last Year: No         OBJECTIVE:     Vital Signs:  Vital Signs  Pulse: 73  SpO2: 96 %  BP: 120/78      Review of Systems   Constitutional:  Positive for activity change. Negative for appetite change and fever.   Respiratory:  Negative for cough, chest tightness, shortness of breath and wheezing.    Cardiovascular:  Positive for leg swelling. Negative for chest pain and palpitations.   Gastrointestinal:  Positive for constipation and diarrhea. Negative for abdominal pain.   Genitourinary:  Negative for difficulty urinating and hematuria.   Musculoskeletal:  Positive for arthralgias and gait problem. Negative for joint swelling.   Skin:  Negative for color change and wound.   Neurological:  Positive for weakness. Negative for tremors, seizures and numbness.       Physical Exam  Constitutional:       Appearance: He is morbidly obese.   HENT:      Head: Normocephalic and atraumatic.   Cardiovascular:      Rate and Rhythm: Normal rate.      Pulses: Normal pulses.   Pulmonary:      Effort: Pulmonary effort is normal.      Breath sounds: Normal breath sounds.   Abdominal:      General: Bowel sounds are normal. There is distension.      Palpations: Abdomen is soft.   Musculoskeletal:      Right lower le+ Pitting Edema present.      Left lower le+ Pitting Edema present.      Comments: Mobility limited due to obesity.    Skin:     General: Skin is warm and dry.      Capillary Refill: Capillary refill takes less than 2 seconds.   Neurological:      General: No focal deficit present.      Mental Status: He is alert and oriented to person, place, and  time. Mental status is at baseline.      Gait: Gait abnormal.   Psychiatric:         Mood and Affect: Mood normal.         Thought Content: Thought content normal.           INSTRUCTIONS FOR PATIENT:     Scheduled Follow-up, Appts Reviewed with Modifications if Needed: Yes  Future Appointments   Date Time Provider Department Center   4/21/2025 11:30 AM Arie Durham NP [facility] CARDIO [place]   6/4/2025 10:20 AM Carlos Cervantes MD [facility]       Current Medications[1]    Medication Reconciliation:  Were medications changed during this appointment? No  Were routine medications in the home? Yes  Is the patient taking the medications as directed? Yes  Does the patient/caregiver understand the medications and changes if any? Yes  Does updated med list accurately reflects meds patient is currently taking? Yes    Signature:      NGUYEN NP      Time: I spent a total of 60+ minutes on the day of the visit.This includes face to face time and non-face to face time preparing to see the patient (eg, review of tests), obtaining and/or reviewing separately obtained history, documenting clinical information in the electronic or other health record, independently interpreting results and communicating results to the patient/family/caregiver, or care coordinator.         [1]   Current Outpatient Medications:     amiodarone (PACERONE) 200 MG Tab, Take 1 tablet (200 mg total) by mouth once daily., Disp: 90 tablet, Rfl: 3    b complex vitamins tablet, Take 1 tablet by mouth every Mon, Wed, Fri., Disp: , Rfl:     bismuth subsalicylate (BISMAREX) 262 mg Chew, Take 524 mg by mouth daily as needed (diarrhea)., Disp: , Rfl:     DULoxetine (CYMBALTA) 60 MG capsule, Take 1 capsule (60 mg total) by mouth every evening. TAKE 1 CAPSULE(60 MG) BY MOUTH EVERY EVENING, Disp: 90 capsule, Rfl: 3    famotidine (PEPCID) 20 MG tablet, Take 1 tablet (20 mg total) by mouth once daily., Disp: 90 tablet, Rfl: 3    ferrous sulfate  (IRON, FERROUS SULFATE,) 325 mg (65 mg iron) Tab tablet, Take 1 tablet (325 mg total) by mouth daily with breakfast. To build blood levels back up, Disp: 45 tablet, Rfl: 0    finasteride (PROSCAR) 5 mg tablet, TAKE 1 TABLET(5 MG) BY MOUTH DAILY, Disp: 90 tablet, Rfl: 3    losartan (COZAAR) 50 MG tablet, Take 1 tablet (50 mg total) by mouth 2 (two) times daily., Disp: 180 tablet, Rfl: 1    metoprolol succinate (TOPROL-XL) 50 MG 24 hr tablet, Take 1 tablet (50 mg total) by mouth 2 (two) times daily., Disp: 180 tablet, Rfl: 3    multivitamin (THERAGRAN) per tablet, Take 1 tablet by mouth once daily., Disp: , Rfl:     nystatin (MYCOSTATIN) powder, Apply topically 4 (four) times daily. (Patient taking differently: Apply 1 g topically 4 (four) times daily as needed (skin fold irretations).), Disp: 60 g, Rfl: 3    soft lens adjunctive solutions (SALINE OPHT), Place 1 drop into both eyes 3 (three) times daily., Disp: , Rfl:     tamsulosin (FLOMAX) 0.4 mg Cap, Take 1 capsule (0.4 mg total) by mouth once daily., Disp: 90 capsule, Rfl: 0    torsemide 40 mg Tab, Take 40 mg by mouth once daily., Disp: 90 tablet, Rfl: 3

## 2025-04-19 PROBLEM — Z78.9 IMPAIRED MOBILITY AND ACTIVITIES OF DAILY LIVING: Status: ACTIVE | Noted: 2025-04-19

## 2025-04-19 PROBLEM — R60.0 BILATERAL LOWER EXTREMITY EDEMA: Status: ACTIVE | Noted: 2025-04-19

## 2025-04-19 PROBLEM — Z74.09 IMPAIRED MOBILITY AND ACTIVITIES OF DAILY LIVING: Status: ACTIVE | Noted: 2025-04-19

## 2025-04-21 ENCOUNTER — OFFICE VISIT (OUTPATIENT)
Dept: CARDIOLOGY | Facility: CLINIC | Age: 75
End: 2025-04-21
Payer: MEDICARE

## 2025-04-21 ENCOUNTER — TELEPHONE (OUTPATIENT)
Dept: CARDIOLOGY | Facility: CLINIC | Age: 75
End: 2025-04-21

## 2025-04-21 ENCOUNTER — PATIENT MESSAGE (OUTPATIENT)
Dept: CARDIOLOGY | Facility: CLINIC | Age: 75
End: 2025-04-21

## 2025-04-21 DIAGNOSIS — I10 PRIMARY HYPERTENSION: ICD-10-CM

## 2025-04-21 DIAGNOSIS — E78.2 MIXED HYPERLIPIDEMIA: ICD-10-CM

## 2025-04-21 DIAGNOSIS — N18.31 STAGE 3A CHRONIC KIDNEY DISEASE: ICD-10-CM

## 2025-04-21 DIAGNOSIS — E66.01 MORBID OBESITY: ICD-10-CM

## 2025-04-21 DIAGNOSIS — G47.33 OBSTRUCTIVE SLEEP APNEA SYNDROME: ICD-10-CM

## 2025-04-21 DIAGNOSIS — I48.0 PAROXYSMAL ATRIAL FIBRILLATION: Primary | ICD-10-CM

## 2025-04-21 NOTE — TELEPHONE ENCOUNTER
María PORTILLO Was on the phone with patient after virtual appt with Arie HOLGUIN. I scheduled Nuclear and Echo with María while she gave him the instructions for the test.

## 2025-04-21 NOTE — TELEPHONE ENCOUNTER
----- Message from Crystal sent at 4/21/2025  8:53 AM CDT -----  Contact: self  Type:  Needs Medical AdviceWho Called: selfSymptoms (please be specific): pt sts he spoke with someone and thought his appt was changed to a virtual but is is still up as a in person appt.  Would the patient rather a call back or a response via [facility]? [employer] Call Back Number: [phone] (home) Additional Information: please advise and thank you.

## 2025-04-26 ENCOUNTER — RESULTS FOLLOW-UP (OUTPATIENT)
Dept: FAMILY MEDICINE | Facility: CLINIC | Age: 75
End: 2025-04-26

## 2025-05-02 ENCOUNTER — TELEPHONE (OUTPATIENT)
Dept: FAMILY MEDICINE | Facility: CLINIC | Age: 75
End: 2025-05-02

## 2025-05-05 ENCOUNTER — PATIENT MESSAGE (OUTPATIENT)
Dept: CARDIOLOGY | Facility: HOSPITAL | Age: 75
End: 2025-05-05
Payer: MEDICARE

## 2025-05-05 ENCOUNTER — TELEPHONE (OUTPATIENT)
Dept: CARDIOLOGY | Facility: CLINIC | Age: 75
End: 2025-05-05
Payer: MEDICARE

## 2025-05-05 NOTE — TELEPHONE ENCOUNTER
----- Message from Nancy sent at 5/5/2025 11:24 AM CDT -----  Regarding: Reschedule Appt  Type: Reschedule Appointment RequestName of Caller: pt Pt's appointment is/was: 5/7 and is 5/8 Would the patient rather a call back or a response via [facility]? [employer] Call Back Number: [phone] Information: pt called because he no longer drives and when he called to set up his medical transport for his appointments he found out he needed to schedule at least 8 days in advance. He is asking for someone to call him back ans help get him rescheduled with all his appointments at least 8 days out for his cardi testing and visit.   Please call back to advise. Thanks!

## 2025-05-12 ENCOUNTER — PATIENT MESSAGE (OUTPATIENT)
Dept: CARDIOLOGY | Facility: HOSPITAL | Age: 75
End: 2025-05-12
Payer: MEDICARE

## 2025-05-12 ENCOUNTER — TELEPHONE (OUTPATIENT)
Dept: CARDIOLOGY | Facility: CLINIC | Age: 75
End: 2025-05-12

## 2025-05-12 NOTE — TELEPHONE ENCOUNTER
----- Message from Laurie sent at 5/12/2025 12:34 PM CDT -----  Type:  Reschedule Appointment Request  Caller is requesting to reschedule appointment.   Name of Caller:pt  When is the first available appointment?na  Would the patient rather a call back or a response via [facility]? Call back  Best Call Back Number:[phone]  Additional Information: pt has to schedule 1 week before he gets a ride so he will need to schedule something far enough out to call the ride service 7 days before his appt. Also, he needs to have something scheduled where the last test is finished by no later than 3 pm. Can be Monday- Friday and he can be picked up as early as 7 am. He has a stress test along with other tests that are currently scheduled for 5/14 that he cannot make due to a ride issue and he needs them all rescheduled.     Please call Back to advise. Thanks!

## 2025-05-28 ENCOUNTER — HOSPITAL ENCOUNTER (OUTPATIENT)
Dept: CARDIOLOGY | Facility: HOSPITAL | Age: 75
Discharge: HOME | End: 2025-05-28
Payer: MEDICARE

## 2025-05-28 ENCOUNTER — HOSPITAL ENCOUNTER (OUTPATIENT)
Dept: RADIOLOGY | Facility: HOSPITAL | Age: 75
Discharge: HOME | End: 2025-05-28
Payer: MEDICARE

## 2025-05-28 VITALS — HEIGHT: 71 IN | WEIGHT: 315 LBS | BODY MASS INDEX: 44.1 KG/M2

## 2025-05-28 DIAGNOSIS — I48.0 PAROXYSMAL ATRIAL FIBRILLATION: ICD-10-CM

## 2025-05-28 LAB
CV PHARM DOSE: 0.4 MG
CV STRESS BASE HR: 68 BPM
DIASTOLIC BLOOD PRESSURE: 72 MMHG
OHS CV CPX 1 MINUTE RECOVERY HEART RATE: 89 BPM
OHS CV CPX 85 PERCENT MAX PREDICTED HEART RATE MALE: 125
OHS CV CPX MAX PREDICTED HEART RATE: 147
OHS CV CPX PATIENT IS FEMALE: 0
OHS CV CPX PATIENT IS MALE: 1
OHS CV CPX PEAK DIASTOLIC BLOOD PRESSURE: 75 MMHG
OHS CV CPX PEAK HEAR RATE: 110 BPM
OHS CV CPX PEAK RATE PRESSURE PRODUCT: NORMAL
OHS CV CPX PEAK SYSTOLIC BLOOD PRESSURE: 140 MMHG
OHS CV CPX PERCENT MAX PREDICTED HEART RATE ACHIEVED: 75
OHS CV CPX RATE PRESSURE PRODUCT PRESENTING: 8636
OHS CV INITIAL DOSE: 16 MCG/KG/MIN
OHS CV PEAK DOSE: 49 MCG/KG/MIN
OHS CV PHARM TIME: 938 MIN
SYSTOLIC BLOOD PRESSURE: 127 MMHG

## 2025-05-28 PROCEDURE — 93017 CV STRESS TEST TRACING ONLY: CPT | Mod: PBBFAC,PO

## 2025-05-28 PROCEDURE — 63600175 PHARM REV CODE 636 W HCPCS: Mod: PO

## 2025-05-28 PROCEDURE — 93018 CV STRESS TEST I&R ONLY: CPT | Mod: S$PBB,,, | Performed by: INTERNAL MEDICINE

## 2025-05-28 PROCEDURE — A9502 TC99M TETROFOSMIN: HCPCS | Mod: PO

## 2025-05-28 PROCEDURE — 93017 CV STRESS TEST TRACING ONLY: CPT | Mod: PO

## 2025-05-28 PROCEDURE — 93306 TTE W/DOPPLER COMPLETE: CPT | Mod: PO

## 2025-05-28 PROCEDURE — 78452 HT MUSCLE IMAGE SPECT MULT: CPT | Mod: 26,,, | Performed by: INTERNAL MEDICINE

## 2025-05-28 PROCEDURE — 78452 HT MUSCLE IMAGE SPECT MULT: CPT | Mod: PO

## 2025-05-28 PROCEDURE — 93306 TTE W/DOPPLER COMPLETE: CPT | Mod: 26,,, | Performed by: INTERNAL MEDICINE

## 2025-05-28 PROCEDURE — 93016 CV STRESS TEST SUPVJ ONLY: CPT | Mod: S$PBB,,, | Performed by: INTERNAL MEDICINE

## 2025-05-28 RX ORDER — REGADENOSON 0.08 MG/ML
0.4 INJECTION, SOLUTION INTRAVENOUS
Status: COMPLETED | OUTPATIENT
Start: 2025-05-28 | End: 2025-05-28

## 2025-05-28 RX ADMIN — REGADENOSON 0.4 MG: 0.08 INJECTION, SOLUTION INTRAVENOUS at 09:05

## 2025-05-28 RX ADMIN — TETROFOSMIN 16 MILLICURIE: 1.38 INJECTION, POWDER, LYOPHILIZED, FOR SOLUTION INTRAVENOUS at 09:05

## 2025-05-28 RX ADMIN — TETROFOSMIN 49 MILLICURIE: 1.38 INJECTION, POWDER, LYOPHILIZED, FOR SOLUTION INTRAVENOUS at 09:05

## 2025-05-29 ENCOUNTER — RESULTS FOLLOW-UP (OUTPATIENT)
Dept: CARDIOLOGY | Facility: CLINIC | Age: 75
End: 2025-05-29

## 2025-05-29 LAB
AORTIC SIZE INDEX (SOV): 1.4 CM/M2
AORTIC SIZE INDEX: 1.2 CM/M2
ASCENDING AORTA: 3.2 CM
AV INDEX (PROSTH): 0.65
AV MEAN GRADIENT: 5 MMHG
AV PEAK GRADIENT: 10 MMHG
AV VALVE AREA BY VELOCITY RATIO: 2.8 CM²
AV VALVE AREA: 3.2 CM²
AV VELOCITY RATIO: 0.56
BSA FOR ECHO PROCEDURE: 2.87 M2
CV ECHO LV RWT: 0.74 CM
DOP CALC AO PEAK VEL: 1.6 M/S
DOP CALC AO VTI: 28.1 CM
DOP CALC LVOT AREA: 4.9 CM2
DOP CALC LVOT DIAMETER: 2.5 CM
DOP CALC LVOT PEAK VEL: 0.9 M/S
DOP CALC LVOT STROKE VOLUME: 89.3 CM3
DOP CALCLVOT PEAK VEL VTI: 18.2 CM
E WAVE DECELERATION TIME: 253 MSEC
E/A RATIO: 1
ECHO LV POSTERIOR WALL: 1.6 CM (ref 0.6–1.1)
EJECTION FRACTION: 55 %
FRACTIONAL SHORTENING: 37.2 % (ref 28–44)
INTERVENTRICULAR SEPTUM: 1.7 CM (ref 0.6–1.1)
LEFT ATRIUM AREA SYSTOLIC (APICAL 2 CHAMBER): 21.63 CM2
LEFT ATRIUM AREA SYSTOLIC (APICAL 4 CHAMBER): 21.18 CM2
LEFT ATRIUM SIZE: 4.1 CM
LEFT ATRIUM VOLUME INDEX MOD: 26 ML/M2
LEFT ATRIUM VOLUME MOD: 71 ML
LEFT INTERNAL DIMENSION IN SYSTOLE: 2.7 CM (ref 2.1–4)
LEFT VENTRICLE DIASTOLIC VOLUME INDEX: 30.51 ML/M2
LEFT VENTRICLE DIASTOLIC VOLUME: 83 ML
LEFT VENTRICLE END SYSTOLIC VOLUME APICAL 2 CHAMBER: 72.6 ML
LEFT VENTRICLE END SYSTOLIC VOLUME APICAL 4 CHAMBER: 68.46 ML
LEFT VENTRICLE MASS INDEX: 110.2 G/M2
LEFT VENTRICLE SYSTOLIC VOLUME INDEX: 9.9 ML/M2
LEFT VENTRICLE SYSTOLIC VOLUME: 27 ML
LEFT VENTRICULAR INTERNAL DIMENSION IN DIASTOLE: 4.3 CM (ref 3.5–6)
LEFT VENTRICULAR MASS: 299.7 G
LV LATERAL E/E' RATIO: 10.3 M/S
LVED V (TEICH): 83.46 ML
LVES V (TEICH): 27.45 ML
LVOT MG: 2.22 MMHG
LVOT MV: 0.72 CM/S
MV PEAK A VEL: 0.62 M/S
MV PEAK E VEL: 0.62 M/S
OHS CV RV/LV RATIO: 1.14 CM
PISA TR MAX VEL: 2.4 M/S
RA PRESSURE ESTIMATED: 3 MMHG
RIGHT VENTRICLE DIASTOLIC BASEL DIMENSION: 4.9 CM
RIGHT VENTRICLE DIASTOLIC LENGTH: 8.5 CM
RIGHT VENTRICLE DIASTOLIC MID DIMENSION: 3.3 CM
RIGHT VENTRICULAR END-DIASTOLIC DIMENSION: 4.86 CM
RIGHT VENTRICULAR LENGTH IN DIASTOLE (APICAL 4-CHAMBER VIEW): 8.53 CM
RV MID DIAMA: 3.28 CM
RV TB RVSP: 5 MMHG
RV TISSUE DOPPLER FREE WALL SYSTOLIC VELOCITY 1 (APICAL 4 CHAMBER VIEW): 17.45 CM/S
SINUS: 3.89 CM
STJ: 3.3 CM
TDI LATERAL: 0.06 M/S
TR MAX PG: 24 MMHG
TRICUSPID ANNULAR PLANE SYSTOLIC EXCURSION: 2.1 CM
TV REST PULMONARY ARTERY PRESSURE: 26 MMHG
Z-SCORE OF LEFT VENTRICULAR DIMENSION IN END DIASTOLE: -18.54
Z-SCORE OF LEFT VENTRICULAR DIMENSION IN END SYSTOLE: -14.01

## 2025-06-17 ENCOUNTER — DOCUMENT SCAN (OUTPATIENT)
Dept: HOME HEALTH SERVICES | Facility: HOSPITAL | Age: 75
End: 2025-06-17
Payer: MEDICARE

## 2025-06-17 NOTE — PROGRESS NOTES
Subjective       Patient ID: Corey De La O is a 74 y.o. male.    Patient location- [place], [place]  My location- [place], [place]  Technology used- Audiovisual  Total time for encounter- 20 minutes      Chief Complaint: follow up    HPI      Pt is new to me, PCP Dr. Cervantes.     Pt is a 74 year old male with depression, HTN, HLD, A-fib on amiodarone, CKD3a, BPH, GERD, osteoarthritis, ANURAG. He presents today for follow up. Since last visit, pt had workup with cardiology including echo and stress test. Stress test did show perfusion abnormalities consistent with ischemia. Pt reports dyspnea with exertion at baseline, no recent worsening. No chest pain, but occasional palpitations. He is to be scheduled for an angiogram, but has not yet heard from the scheduling department regarding this. Pt continues with home health nursing. Has had one care at home visit. I do not see follow up scheduled. We reviewed labs drawn in April. Patient states he is taking the iron as recommended, trying to increase protein intake.       Review of Systems   Constitutional:  Negative for activity change and unexpected weight change.   HENT:  Negative for hearing loss, rhinorrhea and trouble swallowing.    Eyes:  Negative for discharge and visual disturbance.   Respiratory:  Negative for chest tightness and wheezing.    Cardiovascular:  Positive for palpitations. Negative for chest pain.   Gastrointestinal:  Negative for blood in stool, constipation, diarrhea and vomiting.   Endocrine: Negative for polydipsia and polyuria.   Genitourinary:  Negative for difficulty urinating, hematuria and urgency.   Musculoskeletal:  Negative for arthralgias, joint swelling and neck pain.   Neurological:  Negative for weakness and headaches.   Psychiatric/Behavioral:  Negative for confusion and dysphoric mood.    All other systems reviewed and are negative.         Objective     Physical Exam  Constitutional:       General: He is not in acute distress.      Appearance: Normal appearance. He is not ill-appearing, toxic-appearing or diaphoretic.   HENT:      Head: Normocephalic and atraumatic.   Pulmonary:      Effort: No respiratory distress.   Neurological:      General: No focal deficit present.      Mental Status: He is alert and oriented to person, place, and time.   Psychiatric:         Mood and Affect: Mood normal.         Behavior: Behavior normal.         Thought Content: Thought content normal.         Judgment: Judgment normal.         This exam is limited, as this is a virtual visit.     1. Paroxysmal atrial fibrillation (Primary)    2. On amiodarone therapy  -followed by cardiology. Needs to be on OAC. Pending urology approval. See below.     3. Abnormal stress test  -see below    4. Primary hypertension  -controlled on current med regimen, monitored by home health    5. Anemia, unspecified type  - CBC Auto Differential; Future  - Iron and TIBC; Future  - Ferritin; Future  -continue iron as recommended by pcp    6. Low serum albumin  -continue high protein diet  - Comprehensive Metabolic Panel; Future    7. Stage 3a chronic kidney disease  -continue to monitor    RTC/ER precautions given.     Messaged Cardiology staff regarding angiogram scheduling. Messaged Dr. Toro, urology,  regarding resumption of OAC (cardiology would like urology clearance before resuming). Message also sent to care are home staff to insure consistent pt follow up.       Update: 3:45 pm on 6/20/25. Discussed with Dr. Toro via secure chat. No objection to patient start OAC. Will send in eliquis 5 BID for him with bleeding precautions. Stop if bleeding occurs and return to ER for gross hematuria.     F/U with pcp in person in 1 month    Hawa Christine PA-C   [Takes medication as prescribed] : takes [None] : Patient does not have any barriers to medication adherence

## 2025-06-20 ENCOUNTER — PATIENT MESSAGE (OUTPATIENT)
Dept: CARDIOLOGY | Facility: CLINIC | Age: 75
End: 2025-06-20
Payer: MEDICARE

## 2025-06-20 ENCOUNTER — OFFICE VISIT (OUTPATIENT)
Dept: FAMILY MEDICINE | Facility: CLINIC | Age: 75
End: 2025-06-20
Payer: MEDICARE

## 2025-06-20 DIAGNOSIS — R06.02 SOB (SHORTNESS OF BREATH): ICD-10-CM

## 2025-06-20 DIAGNOSIS — N18.31 STAGE 3A CHRONIC KIDNEY DISEASE: ICD-10-CM

## 2025-06-20 DIAGNOSIS — R94.39 ABNORMAL STRESS TEST: ICD-10-CM

## 2025-06-20 DIAGNOSIS — R53.83 FATIGUE, UNSPECIFIED TYPE: ICD-10-CM

## 2025-06-20 DIAGNOSIS — I48.0 PAROXYSMAL ATRIAL FIBRILLATION: Primary | ICD-10-CM

## 2025-06-20 DIAGNOSIS — Z79.899 ON AMIODARONE THERAPY: ICD-10-CM

## 2025-06-20 DIAGNOSIS — D64.9 ANEMIA, UNSPECIFIED TYPE: ICD-10-CM

## 2025-06-20 DIAGNOSIS — I10 PRIMARY HYPERTENSION: ICD-10-CM

## 2025-06-20 DIAGNOSIS — R77.0 LOW SERUM ALBUMIN: ICD-10-CM

## 2025-06-20 DIAGNOSIS — R94.39 POSITIVE CARDIAC STRESS TEST: Primary | ICD-10-CM

## 2025-06-20 RX ORDER — SODIUM CHLORIDE 9 MG/ML
INJECTION, SOLUTION INTRAVENOUS ONCE
OUTPATIENT
Start: 2025-06-20 | End: 2025-06-20

## 2025-06-20 RX ORDER — SODIUM CHLORIDE 0.9 % (FLUSH) 0.9 %
10 SYRINGE (ML) INJECTION
Status: SHIPPED | OUTPATIENT
Start: 2025-06-20

## 2025-06-20 NOTE — PROGRESS NOTES
Angiogram    Arrive for procedure at: [facility] on 7/2/25 at 10 am. Your procedure is scheduled for 12 noon with Dr Asif Leong.    You will receive a phone call from [facility] Pre-Op Department with further instructions and exact arrival time prior to your scheduled procedure.    Notify the nurse if you are ALLERGIC TO IODINE.    FASTING: If your procedure is scheduled in the afternoon, you may have a LIGHT BREAKFAST 6-8 hours prior to your procedure.  For example: Two slices of toast; black coffee or black tea.    MEDICATIONS: You may take your regular morning medications with water. If there are any medications that you should not take, you will be instructed to hold them for that morning.    CARDIOLOGY PRE-PROCEDURE MEDICATION ORDERS:  ** Please hold any medications that are checked below:    HOLD   # OF DAYS TO HOLD  Please hold GLP-1 Drugs such as Semiglutide, Ozempic, Wegovy, and Mounjaro 8 days prior to procedure.    CONTINUE the Following Medications   Please continue your medications unless otherwise instructed by the pre op nurse    WHAT TO EXPECT:    How long will the procedure take?  The procedure will take an average of 1 - 2 hours to perform.  After the procedure, you will need to lay flat for around 4 - 6 hours to minimize bleeding from the puncture site. If the wrist is accessed you will need to keep your arm still as instructed by the nurse.    When can I go home?  You may be able to be discharged home that same afternoon if there were no complications.  If you have one of the following: balloon; stent; pacemaker or defibrillator procedures, you may spend one night for observation.  Your doctor will determine your discharge based upon your progress.  The results of your procedure will be discussed with you before you are discharged.  Any further testing or procedures will be scheduled for you either before you leave or you will be instructed to call for a future  appointment.      TRANSPORTATION:  PLEASE ARRANGE TO HAVE SOMEONE DRIVE YOU HOME FOLLOWING YOUR PROCEDURE, YOU WILL NOT BE ALLOWED TO DRIVE.

## 2025-06-20 NOTE — Clinical Note
Hi! I had a virtual with patient today, and he mentioned not having heard anything about scheduling his angiogram. I told him I would reach out to you guys to see if you can aid in scheduling. Thanks so much!

## 2025-06-20 NOTE — Clinical Note
I was able to restart pt on his OAC today after approval from his urologist. Will he need to hold this prior to [facility]? Thanks so much!

## 2025-06-20 NOTE — Clinical Note
Hi! I saw that patient established with mike for care at home. I do not see any follow up scheduled, so I wanted to check in and make sure that this service will continue. thanks

## 2025-06-20 NOTE — PATIENT INSTRUCTIONS
A few reminders from today:    Labs on 7/14        Follow up with me if needed.   Please go to ER/urgent care if symptoms persist/worsen, especially if after hours.     Do not hesitate to get in touch with me should you have any further questions.     Thank you for trusting me with your care!  I wish you health and happiness.    -Hawa Christine PA-C

## 2025-06-23 ENCOUNTER — EXTERNAL HOME HEALTH (OUTPATIENT)
Dept: HOME HEALTH SERVICES | Facility: HOSPITAL | Age: 75
End: 2025-06-23
Payer: MEDICARE

## 2025-06-24 NOTE — TELEPHONE ENCOUNTER
Received msg from primary care that pt has resumed Eliquis. Spoke to pt and advised to take last doses on 6/28 and to hold after that until after procedure.

## 2025-07-02 PROBLEM — R06.02 SOB (SHORTNESS OF BREATH): Status: ACTIVE | Noted: 2025-07-02

## 2025-07-07 ENCOUNTER — TELEPHONE (OUTPATIENT)
Dept: CARDIOLOGY | Facility: CLINIC | Age: 75
End: 2025-07-07
Payer: MEDICARE

## 2025-07-07 DIAGNOSIS — Z95.5 STENTED CORONARY ARTERY: Primary | ICD-10-CM

## 2025-07-07 NOTE — TELEPHONE ENCOUNTER
----- Message from Andre Leong MD sent at 7/4/2025  8:48 AM CDT -----  Regarding: FW: Request for Outpatient Cardiac Rehab Orders  fine  ----- Message -----  From: Brenda Estes LPN  Sent: 7/3/2025   3:32 PM CDT  To: Andre Leong MD  Subject: FW: Request for Outpatient Cardiac Rehab Ord#    Is okay to put in cardiac rehab orders?  ----- Message -----  From: Jovana Dinero  Sent: 7/3/2025   3:25 PM CDT  To: [facility] Card Rehab Clinical Support; Salo Ortiz  Subject: Request for Outpatient Cardiac Rehab Orders      Your patient qualifies for Outpatient Cardiac Rehab.  If you agree, please complete order CRR4 - CARDIAC REHAB PHASE II for the following qualifying diagnosis only:     -  PATSY Z95.5    Thank you!

## 2025-07-09 PROBLEM — I47.20 VENTRICULAR TACHYCARDIA: Status: ACTIVE | Noted: 2025-07-09

## 2025-07-09 PROBLEM — I21.21: Status: ACTIVE | Noted: 2025-07-09

## 2025-07-10 PROBLEM — R19.7 DIARRHEA: Status: ACTIVE | Noted: 2025-07-10

## 2025-07-10 PROBLEM — R33.8 URINARY RETENTION DUE TO BENIGN PROSTATIC HYPERPLASIA: Status: ACTIVE | Noted: 2025-07-10

## 2025-07-10 PROBLEM — R33.9 URINARY RETENTION: Status: ACTIVE | Noted: 2025-07-10

## 2025-07-10 PROBLEM — I25.10 LIMITATION OF ACTIVITIES DUE TO CARDIOVASCULAR DISORDER: Status: ACTIVE | Noted: 2025-07-10

## 2025-07-10 PROBLEM — R06.02 SOB (SHORTNESS OF BREATH): Status: RESOLVED | Noted: 2025-07-02 | Resolved: 2025-07-10

## 2025-07-10 PROBLEM — Z74.09 IMPAIRED MOBILITY: Status: RESOLVED | Noted: 2025-04-19 | Resolved: 2025-07-10

## 2025-07-10 PROBLEM — N40.1 URINARY RETENTION DUE TO BENIGN PROSTATIC HYPERPLASIA: Status: ACTIVE | Noted: 2025-07-10

## 2025-07-10 PROBLEM — Z73.6 LIMITATION OF ACTIVITIES DUE TO CARDIOVASCULAR DISORDER: Status: ACTIVE | Noted: 2025-07-10

## 2025-07-16 ENCOUNTER — TELEPHONE (OUTPATIENT)
Dept: CARDIOLOGY | Facility: CLINIC | Age: 75
End: 2025-07-16
Payer: MEDICARE

## 2025-07-16 NOTE — TELEPHONE ENCOUNTER
Spoke to pt in regards to r/s appt 7/30 due to CHELSIE Durham rounding in the hospital. Pt v/u and stated that he is currently admitted in hospital due to a procedure. Pt asked to r/s appt three weeks out from today due to potential cardiac rehab. Appt successfully r/s to 8/11.    I also asked if pt would like to r/s appt with Dr. Edward since it was during the three weeks of potential cardiac rehab, pt agreed and accepted r/s

## 2025-08-01 ENCOUNTER — DOCUMENT SCAN (OUTPATIENT)
Dept: HOME HEALTH SERVICES | Facility: HOSPITAL | Age: 75
End: 2025-08-01
Payer: MEDICARE

## 2025-08-06 PROBLEM — R31.9 HEMATURIA: Status: ACTIVE | Noted: 2025-08-06

## 2025-08-06 PROBLEM — Z71.89 ACP (ADVANCE CARE PLANNING): Status: ACTIVE | Noted: 2025-08-06

## 2025-08-06 PROBLEM — R00.0 TACHYCARDIA: Status: ACTIVE | Noted: 2025-08-06

## 2025-08-06 PROBLEM — N39.0 UTI (URINARY TRACT INFECTION): Status: ACTIVE | Noted: 2025-08-06

## 2025-08-08 PROBLEM — Z74.09 IMPAIRED MOBILITY AND ACTIVITIES OF DAILY LIVING: Status: ACTIVE | Noted: 2025-08-08

## 2025-08-08 PROBLEM — Z78.9 IMPAIRED MOBILITY AND ACTIVITIES OF DAILY LIVING: Status: ACTIVE | Noted: 2025-08-08

## 2025-08-14 DIAGNOSIS — I48.0 PAROXYSMAL ATRIAL FIBRILLATION: Primary | ICD-10-CM

## 2025-08-14 PROBLEM — I48.19 PERSISTENT ATRIAL FIBRILLATION: Status: ACTIVE | Noted: 2025-08-14

## 2025-08-15 PROBLEM — I48.0 PAF (PAROXYSMAL ATRIAL FIBRILLATION): Status: ACTIVE | Noted: 2025-08-15

## 2025-08-22 PROBLEM — D64.9 ANEMIA: Status: ACTIVE | Noted: 2025-08-22

## 2025-08-23 PROBLEM — E87.6 HYPOKALEMIA: Status: ACTIVE | Noted: 2025-08-23

## 2025-08-26 DIAGNOSIS — Z95.818 PRESENCE OF WATCHMAN LEFT ATRIAL APPENDAGE CLOSURE DEVICE: ICD-10-CM

## 2025-08-26 DIAGNOSIS — I48.0 PAROXYSMAL ATRIAL FIBRILLATION: Primary | ICD-10-CM

## 2025-08-26 PROBLEM — E87.6 HYPOKALEMIA: Status: RESOLVED | Noted: 2025-08-23 | Resolved: 2025-08-26

## 2025-08-26 PROBLEM — N39.0 UTI (URINARY TRACT INFECTION): Status: RESOLVED | Noted: 2025-08-06 | Resolved: 2025-08-26

## 2025-08-27 ENCOUNTER — TELEPHONE (OUTPATIENT)
Dept: CARDIOLOGY | Facility: CLINIC | Age: 75
End: 2025-08-27
Payer: MEDICARE